# Patient Record
Sex: FEMALE | Race: WHITE | NOT HISPANIC OR LATINO | Employment: UNEMPLOYED | URBAN - NONMETROPOLITAN AREA
[De-identification: names, ages, dates, MRNs, and addresses within clinical notes are randomized per-mention and may not be internally consistent; named-entity substitution may affect disease eponyms.]

---

## 2017-01-26 ENCOUNTER — HOSPITAL ENCOUNTER (EMERGENCY)
Facility: HOSPITAL | Age: 67
Discharge: HOME | End: 2017-01-26
Attending: FAMILY MEDICINE | Admitting: FAMILY MEDICINE

## 2017-01-26 ENCOUNTER — APPOINTMENT (OUTPATIENT)
Dept: GENERAL RADIOLOGY | Facility: HOSPITAL | Age: 67
End: 2017-01-26

## 2017-01-26 VITALS
HEIGHT: 66 IN | OXYGEN SATURATION: 99 % | BODY MASS INDEX: 19.77 KG/M2 | SYSTOLIC BLOOD PRESSURE: 131 MMHG | TEMPERATURE: 98.2 F | HEART RATE: 93 BPM | WEIGHT: 123 LBS | RESPIRATION RATE: 24 BRPM | DIASTOLIC BLOOD PRESSURE: 75 MMHG

## 2017-01-26 DIAGNOSIS — R07.81 PLEURITIC CHEST PAIN: Primary | ICD-10-CM

## 2017-01-26 DIAGNOSIS — J40 BRONCHITIS: ICD-10-CM

## 2017-01-26 DIAGNOSIS — J44.1 COPD WITH ACUTE EXACERBATION (HCC): ICD-10-CM

## 2017-01-26 LAB
A-A DO2: 116.3 MMHG (ref 0–300)
ALBUMIN SERPL-MCNC: 4 G/DL (ref 3.4–4.8)
ALBUMIN/GLOB SERPL: 1.2 G/DL (ref 1.5–2.5)
ALP SERPL-CCNC: 138 U/L (ref 46–116)
ALT SERPL W P-5'-P-CCNC: 9 U/L (ref 10–36)
AMPHET+METHAMPHET UR QL: POSITIVE
ANION GAP SERPL CALCULATED.3IONS-SCNC: 7 MMOL/L (ref 3.6–11.2)
APTT PPP: 30.5 SECONDS (ref 24.4–31)
ARTERIAL PATENCY WRIST A: POSITIVE
AST SERPL-CCNC: 25 U/L (ref 10–30)
ATMOSPHERIC PRESS: 722 MMHG
BACTERIA UR QL AUTO: ABNORMAL /HPF
BARBITURATES UR QL SCN: NEGATIVE
BASE EXCESS BLDA CALC-SCNC: -2.4 MMOL/L
BASOPHILS # BLD AUTO: 0.05 10*3/MM3 (ref 0–0.3)
BASOPHILS NFR BLD AUTO: 0.6 % (ref 0–2)
BDY SITE: ABNORMAL
BENZODIAZ UR QL SCN: NEGATIVE
BILIRUB SERPL-MCNC: 0.3 MG/DL (ref 0.2–1.8)
BILIRUB UR QL STRIP: NEGATIVE
BNP SERPL-MCNC: 262 PG/ML (ref 0–100)
BODY TEMPERATURE: 98.6 C
BUN BLD-MCNC: <5 MG/DL (ref 7–21)
BUN/CREAT SERPL: ABNORMAL (ref 7–25)
CALCIUM SPEC-SCNC: 8.8 MG/DL (ref 7.7–10)
CANNABINOIDS SERPL QL: NEGATIVE
CHLORIDE SERPL-SCNC: 111 MMOL/L (ref 99–112)
CLARITY UR: ABNORMAL
CO2 SERPL-SCNC: 27 MMOL/L (ref 24.3–31.9)
COCAINE UR QL: NEGATIVE
COHGB MFR BLD: 2.8 % (ref 0–5)
COLOR UR: YELLOW
CREAT BLD-MCNC: 0.72 MG/DL (ref 0.43–1.29)
CRP SERPL-MCNC: 6.43 MG/DL (ref 0–0.99)
D-LACTATE SERPL-SCNC: 1.4 MMOL/L (ref 0.5–2)
DEPRECATED RDW RBC AUTO: 56.2 FL (ref 37–54)
EOSINOPHIL # BLD AUTO: 0.08 10*3/MM3 (ref 0–0.7)
EOSINOPHIL NFR BLD AUTO: 0.9 % (ref 0–7)
ERYTHROCYTE [DISTWIDTH] IN BLOOD BY AUTOMATED COUNT: 15.5 % (ref 11.5–14.5)
GFR SERPL CREATININE-BSD FRML MDRD: 81 ML/MIN/1.73
GLOBULIN UR ELPH-MCNC: 3.4 GM/DL
GLUCOSE BLD-MCNC: 120 MG/DL (ref 70–110)
GLUCOSE UR STRIP-MCNC: NEGATIVE MG/DL
HCO3 BLDA-SCNC: 23 MMOL/L (ref 22–26)
HCT VFR BLD AUTO: 36.9 % (ref 37–47)
HCT VFR BLD CALC: 37 % (ref 37–47)
HGB BLD-MCNC: 11.9 G/DL (ref 12–16)
HGB BLDA-MCNC: 12.6 G/DL (ref 12–16)
HGB UR QL STRIP.AUTO: ABNORMAL
HOROWITZ INDEX BLD+IHG-RTO: 36 %
HYALINE CASTS UR QL AUTO: ABNORMAL /LPF
IMM GRANULOCYTES # BLD: 0.01 10*3/MM3 (ref 0–0.03)
IMM GRANULOCYTES NFR BLD: 0.1 % (ref 0–0.5)
INR PPP: 0.95 (ref 0.8–1.1)
KETONES UR QL STRIP: NEGATIVE
LEUKOCYTE ESTERASE UR QL STRIP.AUTO: NEGATIVE
LYMPHOCYTES # BLD AUTO: 0.99 10*3/MM3 (ref 1–3)
LYMPHOCYTES NFR BLD AUTO: 11.7 % (ref 16–46)
MCH RBC QN AUTO: 31.8 PG (ref 27–33)
MCHC RBC AUTO-ENTMCNC: 32.2 G/DL (ref 33–37)
MCV RBC AUTO: 98.7 FL (ref 80–94)
METHADONE UR QL SCN: NEGATIVE
METHGB BLD QL: 0.2 % (ref 0–3)
MODALITY: ABNORMAL
MONOCYTES # BLD AUTO: 1.45 10*3/MM3 (ref 0.1–0.9)
MONOCYTES NFR BLD AUTO: 17.1 % (ref 0–12)
NEUTROPHILS # BLD AUTO: 5.9 10*3/MM3 (ref 1.4–6.5)
NEUTROPHILS NFR BLD AUTO: 69.6 % (ref 40–75)
NITRITE UR QL STRIP: NEGATIVE
OPIATES UR QL: NEGATIVE
OSMOLALITY SERPL CALC.SUM OF ELEC: NORMAL MOSM/KG (ref 273–305)
OXYCODONE UR QL SCN: NEGATIVE
OXYHGB MFR BLDV: 92.7 % (ref 85–100)
PCO2 BLDA: 41.6 MM HG (ref 35–45)
PCP UR QL SCN: NEGATIVE
PH BLDA: 7.36 PH UNITS (ref 7.35–7.45)
PH UR STRIP.AUTO: 5.5 [PH] (ref 5–8)
PLATELET # BLD AUTO: 256 10*3/MM3 (ref 130–400)
PMV BLD AUTO: 10.2 FL (ref 6–10)
PO2 BLDA: 78.4 MM HG (ref 80–100)
POTASSIUM BLD-SCNC: 3.4 MMOL/L (ref 3.5–5.3)
PROPOXYPH UR QL: NEGATIVE
PROT SERPL-MCNC: 7.4 G/DL (ref 6–8)
PROT UR QL STRIP: NEGATIVE
PROTHROMBIN TIME: 10.7 SECONDS (ref 9.8–11.9)
RBC # BLD AUTO: 3.74 10*6/MM3 (ref 4.2–5.4)
RBC # UR: ABNORMAL /HPF
REF LAB TEST METHOD: ABNORMAL
SAO2 % BLDCOA: 95.6 % (ref 90–100)
SODIUM BLD-SCNC: 145 MMOL/L (ref 135–153)
SP GR UR STRIP: 1.03 (ref 1–1.03)
SQUAMOUS #/AREA URNS HPF: ABNORMAL /HPF
TROPONIN I SERPL-MCNC: <0.006 NG/ML
UROBILINOGEN UR QL STRIP: ABNORMAL
WBC NRBC COR # BLD: 8.48 10*3/MM3 (ref 4.5–12.5)
WBC UR QL AUTO: ABNORMAL /HPF
YEAST URNS QL MICRO: ABNORMAL /HPF

## 2017-01-26 PROCEDURE — 80307 DRUG TEST PRSMV CHEM ANLYZR: CPT | Performed by: FAMILY MEDICINE

## 2017-01-26 PROCEDURE — 81001 URINALYSIS AUTO W/SCOPE: CPT | Performed by: FAMILY MEDICINE

## 2017-01-26 PROCEDURE — 86140 C-REACTIVE PROTEIN: CPT | Performed by: FAMILY MEDICINE

## 2017-01-26 PROCEDURE — 93005 ELECTROCARDIOGRAM TRACING: CPT | Performed by: FAMILY MEDICINE

## 2017-01-26 PROCEDURE — 82375 ASSAY CARBOXYHB QUANT: CPT | Performed by: FAMILY MEDICINE

## 2017-01-26 PROCEDURE — 93010 ELECTROCARDIOGRAM REPORT: CPT | Performed by: INTERNAL MEDICINE

## 2017-01-26 PROCEDURE — 87086 URINE CULTURE/COLONY COUNT: CPT | Performed by: FAMILY MEDICINE

## 2017-01-26 PROCEDURE — 82805 BLOOD GASES W/O2 SATURATION: CPT | Performed by: FAMILY MEDICINE

## 2017-01-26 PROCEDURE — 94640 AIRWAY INHALATION TREATMENT: CPT

## 2017-01-26 PROCEDURE — 83605 ASSAY OF LACTIC ACID: CPT | Performed by: FAMILY MEDICINE

## 2017-01-26 PROCEDURE — 71010 HC CHEST PA OR AP: CPT

## 2017-01-26 PROCEDURE — 84484 ASSAY OF TROPONIN QUANT: CPT | Performed by: FAMILY MEDICINE

## 2017-01-26 PROCEDURE — 83050 HGB METHEMOGLOBIN QUAN: CPT | Performed by: FAMILY MEDICINE

## 2017-01-26 PROCEDURE — 96375 TX/PRO/DX INJ NEW DRUG ADDON: CPT

## 2017-01-26 PROCEDURE — 87040 BLOOD CULTURE FOR BACTERIA: CPT | Performed by: FAMILY MEDICINE

## 2017-01-26 PROCEDURE — 85025 COMPLETE CBC W/AUTO DIFF WBC: CPT | Performed by: FAMILY MEDICINE

## 2017-01-26 PROCEDURE — 96365 THER/PROPH/DIAG IV INF INIT: CPT

## 2017-01-26 PROCEDURE — 71010 XR CHEST 1 VW: CPT | Performed by: RADIOLOGY

## 2017-01-26 PROCEDURE — 85610 PROTHROMBIN TIME: CPT | Performed by: FAMILY MEDICINE

## 2017-01-26 PROCEDURE — 25010000002 CEFTRIAXONE: Performed by: FAMILY MEDICINE

## 2017-01-26 PROCEDURE — 85730 THROMBOPLASTIN TIME PARTIAL: CPT | Performed by: FAMILY MEDICINE

## 2017-01-26 PROCEDURE — 36600 WITHDRAWAL OF ARTERIAL BLOOD: CPT | Performed by: FAMILY MEDICINE

## 2017-01-26 PROCEDURE — 80053 COMPREHEN METABOLIC PANEL: CPT | Performed by: FAMILY MEDICINE

## 2017-01-26 PROCEDURE — 25010000002 METHYLPREDNISOLONE PER 125 MG: Performed by: FAMILY MEDICINE

## 2017-01-26 PROCEDURE — 83880 ASSAY OF NATRIURETIC PEPTIDE: CPT | Performed by: FAMILY MEDICINE

## 2017-01-26 PROCEDURE — 99284 EMERGENCY DEPT VISIT MOD MDM: CPT

## 2017-01-26 PROCEDURE — 36415 COLL VENOUS BLD VENIPUNCTURE: CPT

## 2017-01-26 RX ORDER — SODIUM CHLORIDE 0.9 % (FLUSH) 0.9 %
10 SYRINGE (ML) INJECTION AS NEEDED
Status: DISCONTINUED | OUTPATIENT
Start: 2017-01-26 | End: 2017-01-26 | Stop reason: HOSPADM

## 2017-01-26 RX ORDER — ACETAMINOPHEN 325 MG/1
TABLET ORAL
Status: COMPLETED
Start: 2017-01-26 | End: 2017-01-26

## 2017-01-26 RX ORDER — METHYLPREDNISOLONE SODIUM SUCCINATE 125 MG/2ML
125 INJECTION, POWDER, LYOPHILIZED, FOR SOLUTION INTRAMUSCULAR; INTRAVENOUS ONCE
Status: COMPLETED | OUTPATIENT
Start: 2017-01-26 | End: 2017-01-26

## 2017-01-26 RX ORDER — PREDNISONE 20 MG/1
20 TABLET ORAL 3 TIMES DAILY
Qty: 15 TABLET | Refills: 0 | Status: SHIPPED | OUTPATIENT
Start: 2017-01-26 | End: 2017-01-31

## 2017-01-26 RX ORDER — DOXYCYCLINE 100 MG/1
100 CAPSULE ORAL ONCE
Status: COMPLETED | OUTPATIENT
Start: 2017-01-26 | End: 2017-01-26

## 2017-01-26 RX ORDER — DOXYCYCLINE HYCLATE 100 MG/1
100 TABLET, DELAYED RELEASE ORAL 2 TIMES DAILY
Qty: 20 TABLET | Refills: 0 | Status: SHIPPED | OUTPATIENT
Start: 2017-01-26 | End: 2017-02-05

## 2017-01-26 RX ORDER — IPRATROPIUM BROMIDE AND ALBUTEROL SULFATE 2.5; .5 MG/3ML; MG/3ML
3 SOLUTION RESPIRATORY (INHALATION) ONCE
Status: COMPLETED | OUTPATIENT
Start: 2017-01-26 | End: 2017-01-26

## 2017-01-26 RX ORDER — ACETAMINOPHEN 325 MG/1
1000 TABLET ORAL ONCE
Status: COMPLETED | OUTPATIENT
Start: 2017-01-26 | End: 2017-01-26

## 2017-01-26 RX ADMIN — IPRATROPIUM BROMIDE AND ALBUTEROL SULFATE 3 ML: .5; 3 SOLUTION RESPIRATORY (INHALATION) at 16:33

## 2017-01-26 RX ADMIN — CEFTRIAXONE 1 G: 1 INJECTION, POWDER, FOR SOLUTION INTRAMUSCULAR; INTRAVENOUS at 18:52

## 2017-01-26 RX ADMIN — HYDROCODONE POLISTIREX AND CHLORPHENIRAMINE POLISTIREX 5 ML: 10; 8 SUSPENSION, EXTENDED RELEASE ORAL at 18:52

## 2017-01-26 RX ADMIN — METHYLPREDNISOLONE SODIUM SUCCINATE 125 MG: 125 INJECTION, POWDER, FOR SOLUTION INTRAMUSCULAR; INTRAVENOUS at 17:20

## 2017-01-26 RX ADMIN — ACETAMINOPHEN 975 MG: 325 TABLET ORAL at 17:54

## 2017-01-26 RX ADMIN — DOXYCYCLINE 100 MG: 100 CAPSULE ORAL at 18:52

## 2017-01-26 RX ADMIN — ACETAMINOPHEN 975 MG: 325 TABLET, FILM COATED ORAL at 17:54

## 2017-01-26 NOTE — DISCHARGE INSTRUCTIONS
Advised pt to continue home oxygen at 4 L   Also to do breathing treatments ( [facility]) q4hrs for the next 3 days

## 2017-01-26 NOTE — ED PROVIDER NOTES
Subjective   Patient is a 66 y.o. female presenting with shortness of breath.   History provided by:  Relative and patient  Shortness of Breath   Severity:  Moderate  Onset quality:  Gradual  Timing:  Intermittent  Progression:  Worsening  Chronicity:  New  Context: not activity    Relieved by:  Nothing  Worsened by:  Coughing  Ineffective treatments:  None tried  Associated symptoms: cough, sore throat and sputum production    Associated symptoms: no abdominal pain, no chest pain, no fever, no headaches, no neck pain, no rash, no vomiting and no wheezing    Risk factors: tobacco use    Risk factors: no recent alcohol use, no family hx of DVT, no hx of cancer, no hx of PE/DVT, no obesity, no oral contraceptive use, no prolonged immobilization and no recent surgery        Review of Systems   Constitutional: Negative for activity change, appetite change, chills, fatigue and fever.   HENT: Positive for sore throat. Negative for congestion.    Eyes: Negative for pain.   Respiratory: Positive for cough, sputum production and shortness of breath. Negative for wheezing and stridor.    Cardiovascular: Negative for chest pain.   Gastrointestinal: Negative for abdominal pain, diarrhea, nausea and vomiting.   Genitourinary: Negative for dysuria.   Musculoskeletal: Negative for arthralgias, myalgias, neck pain and neck stiffness.   Skin: Negative for rash.   Neurological: Negative for dizziness, syncope, speech difficulty, weakness and headaches.   Psychiatric/Behavioral: Negative for agitation.       Past Medical History   Diagnosis Date   • Anxiety    • Cancer      skin cancer   • CHF (congestive heart failure)    • COPD (chronic obstructive pulmonary disease)    • Coronary artery disease    • Disease of thyroid gland    • History of transfusion    • Hypertension    • Panic attack    • Peptic ulcer hemorrhagic    • PONV (postoperative nausea and vomiting)    • Seizure    • Stroke      TIA       No Known Allergies    Past  Surgical History   Procedure Laterality Date   • Esophagoscopy / egd  2001   • Hysterectomy     • Shoulder surgery     • Cardiac surgery     • Coronary angioplasty with stent placement  2011 12 cardiac stents   • Fracture surgery     • Vascular surgery     • Cardiac pacemaker placement  2012   • Cholecystectomy with intraoperative cholangiogram N/A 9/27/2016     Procedure: CHOLECYSTECTOMY LAPAROSCOPIC INTRAOPERATIVE CHOLANGIOGRAM;  Surgeon: Gonzalez Marquis MD;  Location: [facility] OR;  Service:        History reviewed. No pertinent family history.    Social History     Social History   • Marital status:      Spouse name: N/A   • Number of children: N/A   • Years of education: N/A     Social History Main Topics   • Smoking status: Current Every Day Smoker     Packs/day: 1.00   • Smokeless tobacco: Never Used   • Alcohol use No   • Drug use: No   • Sexual activity: Defer     Other Topics Concern   • None     Social History Narrative           Objective   Physical Exam   Constitutional: She is oriented to person, place, and time. She appears well-developed and well-nourished.   HENT:   Head: Normocephalic.   Right Ear: External ear normal.   Left Ear: External ear normal.   Nose: Nose normal.   Mouth/Throat: Oropharynx is clear and moist.   Eyes: EOM are normal. Pupils are equal, round, and reactive to light.   Neck: Neck supple. No tracheal deviation present. No thyromegaly present.   Cardiovascular: Normal rate and regular rhythm.    Pulmonary/Chest: Effort normal. She has wheezes.   Abdominal: Soft.   Musculoskeletal: Normal range of motion.   Neurological: She is alert and oriented to person, place, and time.   Skin: Skin is warm.   Psychiatric: She has a normal mood and affect. Her behavior is normal. Judgment and thought content normal.   Nursing note and vitals reviewed.      Procedures         ED Course  ED Course                  MDM  Number of Diagnoses or Management Options  Bronchitis: new and  does not require workup  COPD with acute exacerbation: established and improving  Pleuritic chest pain: new and does not require workup     Amount and/or Complexity of Data Reviewed  Clinical lab tests: ordered and reviewed  Tests in the radiology section of CPT®: ordered and reviewed  Tests in the medicine section of CPT®: reviewed and ordered  Independent visualization of images, tracings, or specimens: yes    Risk of Complications, Morbidity, and/or Mortality  Presenting problems: moderate  Diagnostic procedures: moderate  Management options: moderate    Patient Progress  Patient progress: stable      Final diagnoses:   Pleuritic chest pain   COPD with acute exacerbation   Bronchitis            Kisha Churchill DO  01/27/17 0814

## 2017-01-27 NOTE — ED NOTES
Patient leaving ED with family x1. Patient has no further needs or questions at discharge. Patient is alert and oriented x4, verbalizes understanding of discharge instructions and follow up care. Patient's IV removed before patient leaving the ED. Patient is in no acute distress at discharge.     Maximino Wilder RN  01/26/17 1939

## 2017-01-29 LAB — BACTERIA SPEC AEROBE CULT: NORMAL

## 2017-01-31 LAB
BACTERIA SPEC AEROBE CULT: NORMAL
BACTERIA SPEC AEROBE CULT: NORMAL

## 2017-03-07 ENCOUNTER — TRANSCRIBE ORDERS (OUTPATIENT)
Dept: ADMINISTRATIVE | Facility: HOSPITAL | Age: 67
End: 2017-03-07

## 2017-03-07 DIAGNOSIS — R06.00 DYSPNEA, UNSPECIFIED TYPE: Primary | ICD-10-CM

## 2017-03-14 ENCOUNTER — APPOINTMENT (OUTPATIENT)
Dept: GENERAL RADIOLOGY | Facility: HOSPITAL | Age: 67
End: 2017-03-14

## 2017-03-14 ENCOUNTER — HOSPITAL ENCOUNTER (EMERGENCY)
Facility: HOSPITAL | Age: 67
Discharge: HOME | End: 2017-03-14
Attending: FAMILY MEDICINE | Admitting: FAMILY MEDICINE

## 2017-03-14 ENCOUNTER — APPOINTMENT (OUTPATIENT)
Dept: CT IMAGING | Facility: HOSPITAL | Age: 67
End: 2017-03-14

## 2017-03-14 VITALS
HEART RATE: 100 BPM | HEIGHT: 65 IN | RESPIRATION RATE: 18 BRPM | DIASTOLIC BLOOD PRESSURE: 70 MMHG | TEMPERATURE: 97.9 F | OXYGEN SATURATION: 97 % | BODY MASS INDEX: 22.33 KG/M2 | WEIGHT: 134 LBS | SYSTOLIC BLOOD PRESSURE: 113 MMHG

## 2017-03-14 DIAGNOSIS — S63.91XA HAND SPRAIN, RIGHT, INITIAL ENCOUNTER: Primary | ICD-10-CM

## 2017-03-14 DIAGNOSIS — S63.501A WRIST SPRAIN, RIGHT, INITIAL ENCOUNTER: ICD-10-CM

## 2017-03-14 PROCEDURE — 25010000002 MORPHINE PER 10 MG: Performed by: FAMILY MEDICINE

## 2017-03-14 PROCEDURE — 99283 EMERGENCY DEPT VISIT LOW MDM: CPT

## 2017-03-14 PROCEDURE — 25010000002 KETOROLAC TROMETHAMINE PER 15 MG: Performed by: FAMILY MEDICINE

## 2017-03-14 PROCEDURE — 96372 THER/PROPH/DIAG INJ SC/IM: CPT

## 2017-03-14 PROCEDURE — 73130 X-RAY EXAM OF HAND: CPT | Performed by: RADIOLOGY

## 2017-03-14 PROCEDURE — 73130 X-RAY EXAM OF HAND: CPT

## 2017-03-14 PROCEDURE — 73110 X-RAY EXAM OF WRIST: CPT | Performed by: RADIOLOGY

## 2017-03-14 PROCEDURE — 73110 X-RAY EXAM OF WRIST: CPT

## 2017-03-14 RX ORDER — MORPHINE SULFATE 2 MG/ML
2 INJECTION, SOLUTION INTRAMUSCULAR; INTRAVENOUS ONCE
Status: COMPLETED | OUTPATIENT
Start: 2017-03-14 | End: 2017-03-14

## 2017-03-14 RX ORDER — ACETAMINOPHEN AND CODEINE PHOSPHATE 300; 30 MG/1; MG/1
1 TABLET ORAL EVERY 6 HOURS PRN
Qty: 12 TABLET | Refills: 0 | Status: SHIPPED | OUTPATIENT
Start: 2017-03-14 | End: 2019-04-26

## 2017-03-14 RX ORDER — KETOROLAC TROMETHAMINE 30 MG/ML
30 INJECTION, SOLUTION INTRAMUSCULAR; INTRAVENOUS ONCE
Status: COMPLETED | OUTPATIENT
Start: 2017-03-14 | End: 2017-03-14

## 2017-03-14 RX ADMIN — KETOROLAC TROMETHAMINE 30 MG: 30 INJECTION, SOLUTION INTRAMUSCULAR; INTRAVENOUS at 17:16

## 2017-03-14 RX ADMIN — MORPHINE SULFATE 2 MG: 2 INJECTION, SOLUTION INTRAMUSCULAR; INTRAVENOUS at 17:16

## 2017-03-14 NOTE — ED PROVIDER NOTES
Subjective   History of Present Illness  65 y/o F 24 hours after a fall striking her R hand and wrist. Pt states that she has had pain and decreased ability to move R hand since hitting it. Pt denies any numbness/tingling. ROM is decreased 2/2 pain.   Review of Systems   Constitutional: Negative for chills and fever.   HENT: Negative for trouble swallowing and voice change.    Eyes: Negative for pain and redness.   Respiratory: Negative for apnea, cough, shortness of breath, wheezing and stridor.    Cardiovascular: Negative for chest pain and palpitations.   Gastrointestinal: Negative for abdominal distention and abdominal pain.   Musculoskeletal:        +R hand and wrist pain   Skin: Negative for color change and pallor.   Neurological: Negative for dizziness, seizures, facial asymmetry, speech difficulty, light-headedness, numbness and headaches.       Past Medical History   Diagnosis Date   • Anxiety    • Cancer      skin cancer   • CHF (congestive heart failure)    • COPD (chronic obstructive pulmonary disease)    • Coronary artery disease    • Disease of thyroid gland    • History of transfusion    • Hypertension    • Panic attack    • Peptic ulcer hemorrhagic    • PONV (postoperative nausea and vomiting)    • Seizure    • Stroke      TIA       No Known Allergies    Past Surgical History   Procedure Laterality Date   • Esophagoscopy / egd  2001   • Hysterectomy     • Shoulder surgery     • Cardiac surgery     • Coronary angioplasty with stent placement  2011 12 cardiac stents   • Fracture surgery     • Vascular surgery     • Cardiac pacemaker placement  2012   • Cholecystectomy with intraoperative cholangiogram N/A 9/27/2016     Procedure: CHOLECYSTECTOMY LAPAROSCOPIC INTRAOPERATIVE CHOLANGIOGRAM;  Surgeon: Gonzalez Marquis MD;  Location: [facility];  Service:        History reviewed. No pertinent family history.    Social History     Social History   • Marital status:      Spouse name: N/A   •  Number of children: N/A   • Years of education: N/A     Social History Main Topics   • Smoking status: Current Every Day Smoker     Packs/day: 1.00   • Smokeless tobacco: Never Used   • Alcohol use No   • Drug use: No   • Sexual activity: Defer     Other Topics Concern   • None     Social History Narrative           Objective   Physical Exam   Constitutional: She is oriented to person, place, and time. She appears well-developed and well-nourished.   HENT:   Head: Normocephalic and atraumatic.   Right Ear: External ear normal.   Left Ear: External ear normal.   Nose: Nose normal.   Mouth/Throat: Oropharynx is clear and moist.   Eyes: Conjunctivae and EOM are normal. Pupils are equal, round, and reactive to light.   Neck: Normal range of motion. Neck supple.   Cardiovascular: Normal rate, regular rhythm and normal heart sounds.    Pulmonary/Chest: Effort normal and breath sounds normal.   Abdominal: Soft.   Musculoskeletal:        Right wrist: She exhibits decreased range of motion and tenderness. She exhibits no bony tenderness, no swelling, no effusion, no crepitus and no deformity.        Right hand: She exhibits decreased range of motion, tenderness and bony tenderness. She exhibits normal capillary refill, no deformity and no laceration. Normal sensation noted.   Neurological: She is alert and oriented to person, place, and time.   Skin: Skin is warm and dry.   Nursing note and vitals reviewed.      Procedures         ED Course  ED Course      Pt given splint and pain medication.            MDM  Number of Diagnoses or Management Options  Hand sprain, right, initial encounter: new and requires workup  Wrist sprain, right, initial encounter: new and requires workup     Amount and/or Complexity of Data Reviewed  Tests in the radiology section of CPT®: ordered and reviewed    Risk of Complications, Morbidity, and/or Mortality  Presenting problems: high  Diagnostic procedures: high  Management options:  high    Patient Progress  Patient progress: stable      Final diagnoses:   Hand sprain, right, initial encounter   Wrist sprain, right, initial encounter            Mark Maravilla MD  03/14/17 1811       Mark Maravilla MD  03/14/17 185

## 2017-04-05 ENCOUNTER — APPOINTMENT (OUTPATIENT)
Dept: CT IMAGING | Facility: HOSPITAL | Age: 67
End: 2017-04-05

## 2017-04-24 ENCOUNTER — APPOINTMENT (OUTPATIENT)
Dept: CT IMAGING | Facility: HOSPITAL | Age: 67
End: 2017-04-24

## 2017-04-27 ENCOUNTER — LAB (OUTPATIENT)
Dept: LAB | Facility: HOSPITAL | Age: 67
End: 2017-04-27

## 2017-04-27 ENCOUNTER — HOSPITAL ENCOUNTER (OUTPATIENT)
Dept: INFUSION THERAPY | Facility: HOSPITAL | Age: 67
Discharge: HOME | End: 2017-04-27

## 2017-04-27 ENCOUNTER — TRANSCRIBE ORDERS (OUTPATIENT)
Dept: ADMINISTRATIVE | Facility: HOSPITAL | Age: 67
End: 2017-04-27

## 2017-04-27 ENCOUNTER — HOSPITAL ENCOUNTER (OUTPATIENT)
Dept: CT IMAGING | Facility: HOSPITAL | Age: 67
Discharge: HOME | End: 2017-04-27
Admitting: NURSE PRACTITIONER

## 2017-04-27 DIAGNOSIS — I25.10 UNSPECIFIED CARDIOVASCULAR DISEASE: ICD-10-CM

## 2017-04-27 DIAGNOSIS — I25.10 UNSPECIFIED CARDIOVASCULAR DISEASE: Primary | ICD-10-CM

## 2017-04-27 DIAGNOSIS — E03.9 UNSPECIFIED HYPOTHYROIDISM: ICD-10-CM

## 2017-04-27 DIAGNOSIS — R06.00 DYSPNEA, UNSPECIFIED TYPE: ICD-10-CM

## 2017-04-27 LAB
ALBUMIN SERPL-MCNC: 3.8 G/DL (ref 3.4–4.8)
ALBUMIN/GLOB SERPL: 1.3 G/DL (ref 1.5–2.5)
ALP SERPL-CCNC: 104 U/L (ref 35–104)
ALT SERPL W P-5'-P-CCNC: 7 U/L (ref 10–36)
ANION GAP SERPL CALCULATED.3IONS-SCNC: 8.1 MMOL/L (ref 3.6–11.2)
AST SERPL-CCNC: 13 U/L (ref 10–30)
BASOPHILS # BLD AUTO: 0.05 10*3/MM3 (ref 0–0.3)
BASOPHILS NFR BLD AUTO: 0.9 % (ref 0–2)
BILIRUB SERPL-MCNC: 0.2 MG/DL (ref 0.2–1.8)
BUN BLD-MCNC: 8 MG/DL (ref 7–21)
BUN/CREAT SERPL: 10.5 (ref 7–25)
CALCIUM SPEC-SCNC: 8.7 MG/DL (ref 7.7–10)
CHLORIDE SERPL-SCNC: 113 MMOL/L (ref 99–112)
CHOLEST SERPL-MCNC: 117 MG/DL (ref 0–200)
CO2 SERPL-SCNC: 20.9 MMOL/L (ref 24.3–31.9)
CREAT BLD-MCNC: 0.76 MG/DL (ref 0.43–1.29)
CREAT BLDA-MCNC: 0.7 MG/DL (ref 0.6–1.3)
DEPRECATED RDW RBC AUTO: 52 FL (ref 37–54)
EOSINOPHIL # BLD AUTO: 0.2 10*3/MM3 (ref 0–0.7)
EOSINOPHIL NFR BLD AUTO: 3.5 % (ref 0–7)
ERYTHROCYTE [DISTWIDTH] IN BLOOD BY AUTOMATED COUNT: 14.8 % (ref 11.5–14.5)
GFR SERPL CREATININE-BSD FRML MDRD: 76 ML/MIN/1.73
GLOBULIN UR ELPH-MCNC: 2.9 GM/DL
GLUCOSE BLD-MCNC: 108 MG/DL (ref 70–110)
HCT VFR BLD AUTO: 34 % (ref 37–47)
HDLC SERPL-MCNC: 50 MG/DL (ref 60–100)
HGB BLD-MCNC: 10.5 G/DL (ref 12–16)
IMM GRANULOCYTES # BLD: 0.02 10*3/MM3 (ref 0–0.03)
IMM GRANULOCYTES NFR BLD: 0.4 % (ref 0–0.5)
LDLC SERPL CALC-MCNC: 40 MG/DL (ref 0–100)
LDLC/HDLC SERPL: 0.79 {RATIO}
LYMPHOCYTES # BLD AUTO: 2.36 10*3/MM3 (ref 1–3)
LYMPHOCYTES NFR BLD AUTO: 41.8 % (ref 16–46)
MCH RBC QN AUTO: 30.8 PG (ref 27–33)
MCHC RBC AUTO-ENTMCNC: 30.9 G/DL (ref 33–37)
MCV RBC AUTO: 99.7 FL (ref 80–94)
MONOCYTES # BLD AUTO: 0.81 10*3/MM3 (ref 0.1–0.9)
MONOCYTES NFR BLD AUTO: 14.3 % (ref 0–12)
NEUTROPHILS # BLD AUTO: 2.21 10*3/MM3 (ref 1.4–6.5)
NEUTROPHILS NFR BLD AUTO: 39.1 % (ref 40–75)
OSMOLALITY SERPL CALC.SUM OF ELEC: 282 MOSM/KG (ref 273–305)
PLATELET # BLD AUTO: 194 10*3/MM3 (ref 130–400)
PMV BLD AUTO: 10.7 FL (ref 6–10)
POTASSIUM BLD-SCNC: 4 MMOL/L (ref 3.5–5.3)
PROT SERPL-MCNC: 6.7 G/DL (ref 6–8)
RBC # BLD AUTO: 3.41 10*6/MM3 (ref 4.2–5.4)
SODIUM BLD-SCNC: 142 MMOL/L (ref 135–153)
TRIGL SERPL-MCNC: 137 MG/DL (ref 0–150)
TSH SERPL DL<=0.05 MIU/L-ACNC: 0.86 MIU/ML (ref 0.55–4.78)
VLDLC SERPL-MCNC: 27.4 MG/DL
WBC NRBC COR # BLD: 5.65 10*3/MM3 (ref 4.5–12.5)

## 2017-04-27 PROCEDURE — 80061 LIPID PANEL: CPT | Performed by: NURSE PRACTITIONER

## 2017-04-27 PROCEDURE — 85025 COMPLETE CBC W/AUTO DIFF WBC: CPT | Performed by: NURSE PRACTITIONER

## 2017-04-27 PROCEDURE — 36415 COLL VENOUS BLD VENIPUNCTURE: CPT

## 2017-04-27 PROCEDURE — 0 IOPAMIDOL PER 1 ML: Performed by: NURSE PRACTITIONER

## 2017-04-27 PROCEDURE — 84443 ASSAY THYROID STIM HORMONE: CPT | Performed by: NURSE PRACTITIONER

## 2017-04-27 PROCEDURE — 71275 CT ANGIOGRAPHY CHEST: CPT

## 2017-04-27 PROCEDURE — 71275 CT ANGIOGRAPHY CHEST: CPT | Performed by: RADIOLOGY

## 2017-04-27 PROCEDURE — 80053 COMPREHEN METABOLIC PANEL: CPT | Performed by: NURSE PRACTITIONER

## 2017-04-27 PROCEDURE — 80185 ASSAY OF PHENYTOIN TOTAL: CPT | Performed by: NURSE PRACTITIONER

## 2017-04-27 PROCEDURE — 80186 ASSAY OF PHENYTOIN FREE: CPT | Performed by: NURSE PRACTITIONER

## 2017-04-27 PROCEDURE — 82565 ASSAY OF CREATININE: CPT

## 2017-04-27 RX ADMIN — IOPAMIDOL 90 ML: 755 INJECTION, SOLUTION INTRAVENOUS at 13:21

## 2017-04-29 LAB
PHENYTOIN FREE SERPL-MCNC: 0.8 UG/ML (ref 1–2)
PHENYTOIN SERPL-MCNC: 8.4 UG/ML (ref 10–20)

## 2017-05-21 ENCOUNTER — HOSPITAL ENCOUNTER (EMERGENCY)
Facility: HOSPITAL | Age: 67
Discharge: LEFT BEFORE DISCHARGE | End: 2017-05-21

## 2017-05-21 PROCEDURE — 99211 OFF/OP EST MAY X REQ PHY/QHP: CPT

## 2017-05-22 VITALS
OXYGEN SATURATION: 99 % | BODY MASS INDEX: 23.49 KG/M2 | HEIGHT: 65 IN | RESPIRATION RATE: 18 BRPM | HEART RATE: 83 BPM | TEMPERATURE: 98.3 F | DIASTOLIC BLOOD PRESSURE: 75 MMHG | SYSTOLIC BLOOD PRESSURE: 127 MMHG | WEIGHT: 141 LBS

## 2017-11-08 ENCOUNTER — TRANSCRIBE ORDERS (OUTPATIENT)
Dept: ADMINISTRATIVE | Facility: HOSPITAL | Age: 67
End: 2017-11-08

## 2017-11-08 DIAGNOSIS — R91.8 MULTIPLE PULMONARY NODULES: Primary | ICD-10-CM

## 2017-11-13 ENCOUNTER — HOSPITAL ENCOUNTER (OUTPATIENT)
Dept: CT IMAGING | Facility: HOSPITAL | Age: 67
Discharge: HOME | End: 2017-11-13
Admitting: INTERNAL MEDICINE

## 2017-11-13 DIAGNOSIS — R91.8 MULTIPLE PULMONARY NODULES: ICD-10-CM

## 2017-11-13 PROCEDURE — 71250 CT THORAX DX C-: CPT | Performed by: RADIOLOGY

## 2017-11-13 PROCEDURE — 71250 CT THORAX DX C-: CPT

## 2018-06-19 ENCOUNTER — OFFICE VISIT (OUTPATIENT)
Dept: FAMILY MEDICINE CLINIC | Facility: CLINIC | Age: 68
End: 2018-06-19

## 2018-06-19 VITALS
DIASTOLIC BLOOD PRESSURE: 60 MMHG | SYSTOLIC BLOOD PRESSURE: 100 MMHG | TEMPERATURE: 98.2 F | WEIGHT: 126 LBS | HEART RATE: 63 BPM | BODY MASS INDEX: 20.99 KG/M2 | HEIGHT: 65 IN

## 2018-06-19 DIAGNOSIS — M81.0 OSTEOPOROSIS, SENILE: ICD-10-CM

## 2018-06-19 DIAGNOSIS — E03.9 ACQUIRED HYPOTHYROIDISM: ICD-10-CM

## 2018-06-19 DIAGNOSIS — M54.41 CHRONIC BILATERAL LOW BACK PAIN WITH BILATERAL SCIATICA: Primary | ICD-10-CM

## 2018-06-19 DIAGNOSIS — I25.118 CORONARY ARTERY DISEASE OF NATIVE ARTERY OF NATIVE HEART WITH STABLE ANGINA PECTORIS (HCC): ICD-10-CM

## 2018-06-19 DIAGNOSIS — J30.89 CHRONIC NONSEASONAL ALLERGIC RHINITIS DUE TO POLLEN: ICD-10-CM

## 2018-06-19 DIAGNOSIS — I10 ESSENTIAL HYPERTENSION: ICD-10-CM

## 2018-06-19 DIAGNOSIS — Z12.2 ENCOUNTER FOR SCREENING FOR LUNG CANCER: ICD-10-CM

## 2018-06-19 DIAGNOSIS — G89.29 CHRONIC BILATERAL LOW BACK PAIN WITH BILATERAL SCIATICA: Primary | ICD-10-CM

## 2018-06-19 DIAGNOSIS — G43.109 MIGRAINE WITH AURA AND WITHOUT STATUS MIGRAINOSUS, NOT INTRACTABLE: ICD-10-CM

## 2018-06-19 DIAGNOSIS — M54.42 CHRONIC BILATERAL LOW BACK PAIN WITH BILATERAL SCIATICA: Primary | ICD-10-CM

## 2018-06-19 DIAGNOSIS — F17.200 TOBACCO USE DISORDER: ICD-10-CM

## 2018-06-19 DIAGNOSIS — I50.42 CHRONIC COMBINED SYSTOLIC AND DIASTOLIC CONGESTIVE HEART FAILURE (HCC): ICD-10-CM

## 2018-06-19 DIAGNOSIS — R09.02 HYPOXIA: ICD-10-CM

## 2018-06-19 DIAGNOSIS — G40.309 NONINTRACTABLE GENERALIZED IDIOPATHIC EPILEPSY WITHOUT STATUS EPILEPTICUS (HCC): ICD-10-CM

## 2018-06-19 DIAGNOSIS — F17.218 NICOTINE DEPENDENCE, CIGARETTES, WITH OTHER NICOTINE-INDUCED DISORDERS: ICD-10-CM

## 2018-06-19 DIAGNOSIS — M25.551 RIGHT HIP PAIN: ICD-10-CM

## 2018-06-19 DIAGNOSIS — E78.2 MIXED HYPERLIPIDEMIA: ICD-10-CM

## 2018-06-19 DIAGNOSIS — K21.00 GASTROESOPHAGEAL REFLUX DISEASE WITH ESOPHAGITIS: ICD-10-CM

## 2018-06-19 DIAGNOSIS — F41.1 GAD (GENERALIZED ANXIETY DISORDER): ICD-10-CM

## 2018-06-19 DIAGNOSIS — F33.41 RECURRENT MAJOR DEPRESSIVE DISORDER, IN PARTIAL REMISSION (HCC): ICD-10-CM

## 2018-06-19 DIAGNOSIS — J41.0 SIMPLE CHRONIC BRONCHITIS (HCC): ICD-10-CM

## 2018-06-19 PROBLEM — M54.40 CHRONIC BILATERAL LOW BACK PAIN WITH SCIATICA: Status: ACTIVE | Noted: 2018-06-19

## 2018-06-19 PROCEDURE — 99214 OFFICE O/P EST MOD 30 MIN: CPT | Performed by: PHYSICIAN ASSISTANT

## 2018-06-19 RX ORDER — CITALOPRAM 20 MG/1
20 TABLET ORAL DAILY
Qty: 90 TABLET | Refills: 3 | Status: SHIPPED | OUTPATIENT
Start: 2018-06-19 | End: 2019-07-05 | Stop reason: SDUPTHER

## 2018-06-19 RX ORDER — ALBUTEROL SULFATE 2.5 MG/3ML
2.5 SOLUTION RESPIRATORY (INHALATION) EVERY 4 HOURS PRN
Qty: 360 ML | Refills: 3 | Status: SHIPPED | OUTPATIENT
Start: 2018-06-19 | End: 2018-06-26

## 2018-06-19 RX ORDER — ALBUTEROL SULFATE 90 UG/1
2 AEROSOL, METERED RESPIRATORY (INHALATION) 3 TIMES DAILY PRN
Qty: 52 G | Refills: 3 | Status: SHIPPED | OUTPATIENT
Start: 2018-06-19 | End: 2018-06-26

## 2018-06-19 RX ORDER — METOPROLOL TARTRATE 50 MG/1
50 TABLET, FILM COATED ORAL DAILY
Qty: 90 TABLET | Refills: 3 | Status: SHIPPED | OUTPATIENT
Start: 2018-06-19 | End: 2019-05-17 | Stop reason: SDUPTHER

## 2018-06-19 RX ORDER — ESOMEPRAZOLE MAGNESIUM 40 MG/1
40 CAPSULE, DELAYED RELEASE ORAL
Qty: 90 CAPSULE | Refills: 3 | Status: SHIPPED | OUTPATIENT
Start: 2018-06-19 | End: 2019-07-05 | Stop reason: SDUPTHER

## 2018-06-19 RX ORDER — FENOFIBRATE 145 MG/1
145 TABLET, COATED ORAL DAILY
Qty: 90 TABLET | Refills: 3 | Status: SHIPPED | OUTPATIENT
Start: 2018-06-19 | End: 2019-05-17 | Stop reason: SDUPTHER

## 2018-06-19 RX ORDER — VENLAFAXINE 75 MG/1
75 TABLET ORAL 2 TIMES DAILY
Qty: 180 TABLET | Refills: 3 | Status: SHIPPED | OUTPATIENT
Start: 2018-06-19 | End: 2019-07-05 | Stop reason: SDUPTHER

## 2018-06-19 RX ORDER — CLOPIDOGREL BISULFATE 75 MG/1
75 TABLET ORAL DAILY
Qty: 90 TABLET | Refills: 3 | Status: SHIPPED | OUTPATIENT
Start: 2018-06-19 | End: 2019-10-24 | Stop reason: SDUPTHER

## 2018-06-19 RX ORDER — POTASSIUM CHLORIDE 750 MG/1
10 TABLET, FILM COATED, EXTENDED RELEASE ORAL DAILY
Qty: 90 TABLET | Refills: 3 | Status: SHIPPED | OUTPATIENT
Start: 2018-06-19 | End: 2019-07-05 | Stop reason: SDUPTHER

## 2018-06-19 RX ORDER — ATORVASTATIN CALCIUM 40 MG/1
40 TABLET, FILM COATED ORAL DAILY
Qty: 90 TABLET | Refills: 3 | Status: SHIPPED | OUTPATIENT
Start: 2018-06-19 | End: 2019-07-05 | Stop reason: SDUPTHER

## 2018-06-19 RX ORDER — FUROSEMIDE 40 MG/1
40 TABLET ORAL DAILY
Qty: 90 TABLET | Refills: 3 | Status: SHIPPED | OUTPATIENT
Start: 2018-06-19 | End: 2020-09-25 | Stop reason: SDUPTHER

## 2018-06-19 RX ORDER — LEVOTHYROXINE SODIUM 0.03 MG/1
25 TABLET ORAL DAILY
Qty: 90 TABLET | Refills: 3 | Status: SHIPPED | OUTPATIENT
Start: 2018-06-19 | End: 2019-07-05 | Stop reason: SDUPTHER

## 2018-06-19 RX ORDER — RANITIDINE 150 MG/1
300 TABLET ORAL DAILY
Qty: 180 TABLET | Refills: 3 | Status: SHIPPED | OUTPATIENT
Start: 2018-06-19 | End: 2019-07-05 | Stop reason: SDUPTHER

## 2018-06-19 RX ORDER — PHENYTOIN SODIUM 100 MG/1
100 CAPSULE, EXTENDED RELEASE ORAL SEE ADMIN INSTRUCTIONS
Qty: 450 CAPSULE | Refills: 3 | Status: SHIPPED | OUTPATIENT
Start: 2018-06-19 | End: 2019-07-05 | Stop reason: SDUPTHER

## 2018-06-19 RX ORDER — ISOSORBIDE MONONITRATE 60 MG/1
60 TABLET, EXTENDED RELEASE ORAL DAILY
Qty: 90 TABLET | Refills: 3 | Status: SHIPPED | OUTPATIENT
Start: 2018-06-19 | End: 2020-09-25

## 2018-06-19 RX ORDER — MONTELUKAST SODIUM 10 MG/1
10 TABLET ORAL NIGHTLY
Qty: 90 TABLET | Refills: 5 | Status: SHIPPED | OUTPATIENT
Start: 2018-06-19 | End: 2020-04-30

## 2018-06-19 NOTE — PROGRESS NOTES
Subjective   Michaela Hutchison is a 67 y.o. female.     Chief complaint-establish care and multiple chronic medical issues including back pain    History of Present Illness     She is here today to establish care.    Chronic low back pain-  She complains of intermittent severe sharp back pains with constant dull low back pain bilaterally.  Pain radiates to the right and left leg but more often to the right.  She has associated right leg numbness and weakness.  She states that right leg will intermittently give out causing frequent falls.  Onset 30 years ago.  Some relief with gabapentin.  She reports abnormal imaging of her lower back in the past requiring her to have epidural injections.  She states imaging was done over 5 years ago in [place].    Fall-  She reports that her right leg weakness caused her to fall coming in to the doctor's office today.  When she fell she did excoriate her right elbow and forearm as well as hit her right hip.  She states that when she fell she throughout her arms to get the brunt of the fall.    Tobacco use disorder-  She complains of nicotine dependence.  She smokes 1 pack per day for over 40 years.  She states that she used to smoke 2 packs per day but has decreased to one pack per day on her own.  She complains of chronic shortness of breath and does have hypoxia requiring 24/7 oxygen which she is not wearing today.  She reports that she uses 3 L of oxygen and often carries a tank when leaving the home to continue oxygen therapy.  She states that her sister  of lung cancer.    Dyslipidemia  Compliance with treatment has been good. The patient exercises intermittently. She is currently being prescribed the following medication for her dyslipidemia - lifestyle modifcation, atorvastatin. Patient denies side effects associated with her medications. Most recent lipids include  Lab Results   Component Value Date    TRIG 137 2017    HDL 50 (L) 2017    LDL 40 2017   Lab work  was from ER visit.    Hypertension-well controlled with metoprolol and Lasix    Coronary artery disease-  She complains of coronary artery disease status post multiple stent placements (patient states 13 stents placed in the past).  She does have chronic angina which is controlled with Imdur.  She has a pacemaker/defibrillator.  She is followed by Dr. Claire at [facility] in [place].    Congestive heart failure-stable with Lasix    Migraine-  She complains of intermittent moderate to severe throbbing headaches with associated aura of flashing lights.  Headaches occur approximately 2-3 times per week lasting for several hours.  Headaches relieved with Tylenol No. 3.  Onset greater than 10 years ago.    COPD-not at goal due to continued cigarette smoking.  Currently taking Spiriva and albuterol inhaler    Chronic allergic rhinitis-stable with Singulair    Gastro esophageal reflux disease-controlled with Nexium    Hypothyroidism-stable with Synthroid 25 µg daily    Epilepsy- well controlled with Dilantin.  She states her last seizure was in 2003 that she does require the brand name to be medically necessary.    Senile osteoporosis-stable with calcium and vitamin D    Depression and anxiety-stable with Celexa.  She states she does not feel the BuSpar helps at all.      The following portions of the patient's history were reviewed and updated as appropriate: allergies, current medications, past family history, past medical history, past social history, past surgical history and problem list.    Review of Systems   Constitutional: Positive for fatigue (chronic). Negative for activity change, appetite change and fever.   HENT: Negative for ear pain, sinus pressure and sore throat.    Eyes: Negative for pain and visual disturbance.   Respiratory: Positive for cough (chronic) and shortness of breath (chronic). Negative for chest tightness.    Cardiovascular: Negative for chest pain and palpitations.   Gastrointestinal:  "Negative for abdominal pain, constipation, diarrhea, nausea and vomiting.   Endocrine: Negative for polydipsia and polyuria.   Genitourinary: Negative for dysuria and frequency.   Musculoskeletal: Positive for arthralgias, back pain and gait problem (frequent falls). Negative for myalgias.   Skin: Negative for color change and rash.   Allergic/Immunologic: Negative for food allergies and immunocompromised state.   Neurological: Negative for dizziness, syncope and headaches.   Hematological: Negative for adenopathy. Does not bruise/bleed easily.   Psychiatric/Behavioral: Positive for dysphoric mood. Negative for hallucinations, self-injury and suicidal ideas. The patient is nervous/anxious.        /60   Pulse 63   Temp 98.2 °F (36.8 °C)   Ht 165.1 cm (65\")   Wt 57.2 kg (126 lb)   BMI 20.97 kg/m²     Physical Exam   Constitutional: She is oriented to person, place, and time. She appears well-developed and well-nourished.   HENT:   Head: Normocephalic and atraumatic.   Nose: Nose normal.   Mouth/Throat: Oropharynx is clear and moist.   Eyes: Conjunctivae and EOM are normal. Pupils are equal, round, and reactive to light.   Neck: Normal range of motion. Neck supple. No tracheal deviation present. No thyromegaly present.   Cardiovascular: Normal rate, regular rhythm, normal heart sounds and intact distal pulses.    No murmur heard.  Pulmonary/Chest: Effort normal and breath sounds normal. No respiratory distress. She has no wheezes.   Abdominal: Soft. Bowel sounds are normal. There is no tenderness. There is no guarding.   Musculoskeletal: Normal range of motion. She exhibits tenderness. She exhibits no edema.   Approximately 2 cm excoriation noted to the skin of right proximal forearm near elbow.  No swelling, erythema, or contusion noted.  Tenderness noted right proximal femur.  Full range of motion with her right hip noted today.   Lymphadenopathy:     She has no cervical adenopathy.   Neurological: She is " alert and oriented to person, place, and time.   Skin: Skin is warm and dry. No rash noted.   Psychiatric: She has a normal mood and affect. Her behavior is normal.   Nursing note and vitals reviewed.      Assessment/Plan     Diagnoses and all orders for this visit:    Chronic bilateral low back pain with bilateral sciatica  -     XR Spine Lumbar AP & Lateral; Future  -     CT lumbar spine w wo contrast; Future    Right hip pain  -     XR Hip With or Without Pelvis 2 - 3 View Right; Future  -     XR femur 2 vw right; Future    Tobacco use disorder  -     CT chest low dose wo; Future    Encounter for screening for lung cancer  -     CT chest low dose wo; Future    Nicotine dependence, cigarettes, with other nicotine-induced disorders   -     CT chest low dose wo; Future    Hypoxia    Mixed hyperlipidemia  -     atorvastatin (LIPITOR) 40 MG tablet; Take 1 tablet by mouth Daily.    Coronary artery disease of native artery of native heart with stable angina pectoris  -     clopidogrel (PLAVIX) 75 MG tablet; Take 1 tablet by mouth Daily.  -     isosorbide mononitrate (IMDUR) 60 MG 24 hr tablet; Take 1 tablet by mouth Daily.    Essential hypertension  -     furosemide (LASIX) 40 MG tablet; Take 1 tablet by mouth Daily.  -     metoprolol tartrate (LOPRESSOR) 50 MG tablet; Take 1 tablet by mouth Daily.    Chronic combined systolic and diastolic congestive heart failure    Migraine with aura and without status migrainosus, not intractable  -     Ambulatory Referral to Neurology    Simple chronic bronchitis  -     tiotropium bromide monohydrate (SPIRIVA RESPIMAT) 2.5 MCG/ACT aerosol solution inhaler; Inhale 2 puffs Daily.    Chronic nonseasonal allergic rhinitis due to pollen    Gastroesophageal reflux disease with esophagitis  -     esomeprazole (nexIUM) 40 MG capsule; Take 1 capsule by mouth Every Morning Before Breakfast.  -     raNITIdine (ZANTAC) 150 MG tablet; Take 2 tablets by mouth Daily.    Acquired hypothyroidism  -      levothyroxine (SYNTHROID, LEVOTHROID) 25 MCG tablet; Take 1 tablet by mouth Daily.    Nonintractable generalized idiopathic epilepsy without status epilepticus  -     DILANTIN 100 MG ER capsule; Take 1 capsule by mouth See Admin Instructions. 2 qam and 3 qhs    Osteoporosis, senile    Recurrent major depressive disorder, in partial remission  -     citalopram (CeleXA) 20 MG tablet; Take 1 tablet by mouth Daily.    BRAEDEN (generalized anxiety disorder)    Other orders  -     albuterol (PROVENTIL HFA;VENTOLIN HFA) 108 (90 Base) MCG/ACT inhaler; Inhale 2 puffs 3 (Three) Times a Day As Needed for Wheezing.  -     albuterol (PROVENTIL) (2.5 MG/3ML) 0.083% nebulizer solution; Take 2.5 mg by nebulization Every 4 (Four) Hours As Needed for Wheezing.  -     fenofibrate (TRICOR) 145 MG tablet; Take 1 tablet by mouth Daily.  -     montelukast (SINGULAIR) 10 MG tablet; Take 1 tablet by mouth Every Night.  -     potassium chloride (K-DUR) 10 MEQ CR tablet; Take 1 tablet by mouth Daily.  -     venlafaxine (EFFEXOR) 75 MG tablet; Take 1 tablet by mouth 2 (Two) Times a Day.      Discontinue BuSpar since not efficacious  All of her nonnarcotic prescriptions were written today.  I'll refer her to neurology to discuss treatment for headaches.  Further imaging and evaluation will be needed on her lower back and leg pain due to progressive worsening of pain and frequent falls.           This document has been electronically signed by:  Birdei Shipley PA-C

## 2018-06-26 DIAGNOSIS — J42 CHRONIC BRONCHITIS, UNSPECIFIED CHRONIC BRONCHITIS TYPE (HCC): Primary | ICD-10-CM

## 2018-06-26 RX ORDER — ALBUTEROL SULFATE 90 UG/1
2 AEROSOL, METERED RESPIRATORY (INHALATION) EVERY 4 HOURS PRN
Qty: 1 INHALER | Refills: 5 | Status: SHIPPED | OUTPATIENT
Start: 2018-06-26

## 2018-06-26 RX ORDER — ALBUTEROL SULFATE 2.5 MG/3ML
2.5 SOLUTION RESPIRATORY (INHALATION) EVERY 4 HOURS PRN
Qty: 180 VIAL | Refills: 5 | Status: SHIPPED | OUTPATIENT
Start: 2018-06-26

## 2018-08-28 ENCOUNTER — APPOINTMENT (OUTPATIENT)
Dept: CT IMAGING | Facility: HOSPITAL | Age: 68
End: 2018-08-28

## 2018-08-28 ENCOUNTER — HOSPITAL ENCOUNTER (EMERGENCY)
Facility: HOSPITAL | Age: 68
Discharge: HOME | End: 2018-08-28
Admitting: FAMILY MEDICINE

## 2018-08-28 VITALS
DIASTOLIC BLOOD PRESSURE: 78 MMHG | OXYGEN SATURATION: 98 % | HEIGHT: 65 IN | RESPIRATION RATE: 16 BRPM | TEMPERATURE: 97.9 F | WEIGHT: 140 LBS | SYSTOLIC BLOOD PRESSURE: 124 MMHG | BODY MASS INDEX: 23.32 KG/M2 | HEART RATE: 69 BPM

## 2018-08-28 DIAGNOSIS — R91.8 PULMONARY NODULES: ICD-10-CM
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
## 2018-08-28 DIAGNOSIS — M47.819 OSTEOARTHRITIS OF SPINE WITHOUT MYELOPATHY OR RADICULOPATHY, UNSPECIFIED SPINAL REGION: ICD-10-CM

## 2018-08-28 DIAGNOSIS — R10.31 RIGHT LOWER QUADRANT ABDOMINAL PAIN: Primary | ICD-10-CM

## 2018-08-28 LAB
ALBUMIN SERPL-MCNC: 4.3 G/DL (ref 3.4–4.8)
ALBUMIN/GLOB SERPL: 1.3 G/DL (ref 1.5–2.5)
ALP SERPL-CCNC: 88 U/L (ref 35–104)
ALT SERPL W P-5'-P-CCNC: 13 U/L (ref 10–36)
ANION GAP SERPL CALCULATED.3IONS-SCNC: 3.7 MMOL/L (ref 3.6–11.2)
AST SERPL-CCNC: 30 U/L (ref 10–30)
BASOPHILS # BLD AUTO: 0.07 10*3/MM3 (ref 0–0.3)
BASOPHILS NFR BLD AUTO: 1 % (ref 0–2)
BILIRUB SERPL-MCNC: 0.2 MG/DL (ref 0.2–1.8)
BUN BLD-MCNC: 20 MG/DL (ref 7–21)
BUN/CREAT SERPL: 15 (ref 7–25)
CALCIUM SPEC-SCNC: 8.9 MG/DL (ref 7.7–10)
CHLORIDE SERPL-SCNC: 111 MMOL/L (ref 99–112)
CO2 SERPL-SCNC: 24.3 MMOL/L (ref 24.3–31.9)
CREAT BLD-MCNC: 1.33 MG/DL (ref 0.43–1.29)
DEPRECATED RDW RBC AUTO: 52.4 FL (ref 37–54)
EOSINOPHIL # BLD AUTO: 0.16 10*3/MM3 (ref 0–0.7)
EOSINOPHIL NFR BLD AUTO: 2.2 % (ref 0–7)
ERYTHROCYTE [DISTWIDTH] IN BLOOD BY AUTOMATED COUNT: 16 % (ref 11.5–14.5)
GFR SERPL CREATININE-BSD FRML MDRD: 40 ML/MIN/1.73
GLOBULIN UR ELPH-MCNC: 3.4 GM/DL
GLUCOSE BLD-MCNC: 96 MG/DL (ref 70–110)
HCT VFR BLD AUTO: 32.7 % (ref 37–47)
HGB BLD-MCNC: 10.5 G/DL (ref 12–16)
IMM GRANULOCYTES # BLD: 0.02 10*3/MM3 (ref 0–0.03)
IMM GRANULOCYTES NFR BLD: 0.3 % (ref 0–0.5)
INR PPP: 1.14 (ref 0.9–1.1)
LYMPHOCYTES # BLD AUTO: 2.76 10*3/MM3 (ref 1–3)
LYMPHOCYTES NFR BLD AUTO: 38.2 % (ref 16–46)
MCH RBC QN AUTO: 29.8 PG (ref 27–33)
MCHC RBC AUTO-ENTMCNC: 32.1 G/DL (ref 33–37)
MCV RBC AUTO: 92.9 FL (ref 80–94)
MONOCYTES # BLD AUTO: 1.4 10*3/MM3 (ref 0.1–0.9)
MONOCYTES NFR BLD AUTO: 19.4 % (ref 0–12)
NEUTROPHILS # BLD AUTO: 2.82 10*3/MM3 (ref 1.4–6.5)
NEUTROPHILS NFR BLD AUTO: 38.9 % (ref 40–75)
OSMOLALITY SERPL CALC.SUM OF ELEC: 280 MOSM/KG (ref 273–305)
PLATELET # BLD AUTO: 231 10*3/MM3 (ref 130–400)
PMV BLD AUTO: 10.1 FL (ref 6–10)
POTASSIUM BLD-SCNC: 4.1 MMOL/L (ref 3.5–5.3)
PROT SERPL-MCNC: 7.7 G/DL (ref 6–8)
PROTHROMBIN TIME: 14.9 SECONDS (ref 11–15.4)
RBC # BLD AUTO: 3.52 10*6/MM3 (ref 4.2–5.4)
SODIUM BLD-SCNC: 139 MMOL/L (ref 135–153)
WBC NRBC COR # BLD: 7.23 10*3/MM3 (ref 4.5–12.5)

## 2018-08-28 PROCEDURE — 0 IOPAMIDOL PER 1 ML: Performed by: PHYSICIAN ASSISTANT

## 2018-08-28 PROCEDURE — 72131 CT LUMBAR SPINE W/O DYE: CPT

## 2018-08-28 PROCEDURE — 70450 CT HEAD/BRAIN W/O DYE: CPT

## 2018-08-28 PROCEDURE — 72128 CT CHEST SPINE W/O DYE: CPT | Performed by: RADIOLOGY

## 2018-08-28 PROCEDURE — 71275 CT ANGIOGRAPHY CHEST: CPT | Performed by: RADIOLOGY

## 2018-08-28 PROCEDURE — 85025 COMPLETE CBC W/AUTO DIFF WBC: CPT | Performed by: PHYSICIAN ASSISTANT

## 2018-08-28 PROCEDURE — 72128 CT CHEST SPINE W/O DYE: CPT

## 2018-08-28 PROCEDURE — 72125 CT NECK SPINE W/O DYE: CPT

## 2018-08-28 PROCEDURE — 71275 CT ANGIOGRAPHY CHEST: CPT

## 2018-08-28 PROCEDURE — 36415 COLL VENOUS BLD VENIPUNCTURE: CPT

## 2018-08-28 PROCEDURE — 74177 CT ABD & PELVIS W/CONTRAST: CPT | Performed by: RADIOLOGY

## 2018-08-28 PROCEDURE — 72131 CT LUMBAR SPINE W/O DYE: CPT | Performed by: RADIOLOGY

## 2018-08-28 PROCEDURE — 85610 PROTHROMBIN TIME: CPT | Performed by: PHYSICIAN ASSISTANT

## 2018-08-28 PROCEDURE — 80053 COMPREHEN METABOLIC PANEL: CPT | Performed by: PHYSICIAN ASSISTANT

## 2018-08-28 PROCEDURE — 72125 CT NECK SPINE W/O DYE: CPT | Performed by: RADIOLOGY

## 2018-08-28 PROCEDURE — 99284 EMERGENCY DEPT VISIT MOD MDM: CPT

## 2018-08-28 PROCEDURE — 74177 CT ABD & PELVIS W/CONTRAST: CPT

## 2018-08-28 PROCEDURE — 70450 CT HEAD/BRAIN W/O DYE: CPT | Performed by: RADIOLOGY

## 2018-08-28 RX ORDER — SODIUM CHLORIDE 0.9 % (FLUSH) 0.9 %
10 SYRINGE (ML) INJECTION AS NEEDED
Status: DISCONTINUED | OUTPATIENT
Start: 2018-08-28 | End: 2018-08-29 | Stop reason: HOSPADM

## 2018-08-28 RX ADMIN — SODIUM CHLORIDE 1000 ML: 9 INJECTION, SOLUTION INTRAVENOUS at 21:59

## 2018-08-28 RX ADMIN — IOPAMIDOL 100 ML: 755 INJECTION, SOLUTION INTRAVENOUS at 21:44

## 2019-02-28 ENCOUNTER — TRANSITIONAL CARE MANAGEMENT TELEPHONE ENCOUNTER (OUTPATIENT)
Dept: FAMILY MEDICINE CLINIC | Facility: CLINIC | Age: 69
End: 2019-02-28

## 2019-02-28 NOTE — OUTREACH NOTE
JOSE ANTONIO call completed.  Please refer to TCM call flowsheet for call documentation.    Pt states she is doing pretty good and feeling much better. She has filled all her d/c rxs except for the neb solution. Pt states the pharmacy has to re-run it through her insurance today. She denies any questions or needs. She confirms hospital f/u 3/14, but is outside TCM. TCM applicable until 3/13. If an appt within TCM is needed, please contact pt to coordinate. Thank you.

## 2019-03-22 ENCOUNTER — OFFICE VISIT (OUTPATIENT)
Dept: FAMILY MEDICINE CLINIC | Facility: CLINIC | Age: 69
End: 2019-03-22

## 2019-03-22 VITALS
SYSTOLIC BLOOD PRESSURE: 120 MMHG | DIASTOLIC BLOOD PRESSURE: 70 MMHG | WEIGHT: 129 LBS | TEMPERATURE: 97.3 F | BODY MASS INDEX: 21.49 KG/M2 | HEIGHT: 65 IN | HEART RATE: 63 BPM | OXYGEN SATURATION: 95 %

## 2019-03-22 DIAGNOSIS — I10 ESSENTIAL HYPERTENSION: ICD-10-CM

## 2019-03-22 DIAGNOSIS — I50.42 CHRONIC COMBINED SYSTOLIC AND DIASTOLIC CONGESTIVE HEART FAILURE (HCC): ICD-10-CM

## 2019-03-22 DIAGNOSIS — J44.1 COPD WITH ACUTE EXACERBATION (HCC): Primary | ICD-10-CM

## 2019-03-22 DIAGNOSIS — E78.2 MIXED HYPERLIPIDEMIA: ICD-10-CM

## 2019-03-22 DIAGNOSIS — J44.1 COPD EXACERBATION (HCC): Primary | ICD-10-CM

## 2019-03-22 PROCEDURE — 99214 OFFICE O/P EST MOD 30 MIN: CPT | Performed by: PHYSICIAN ASSISTANT

## 2019-03-22 RX ORDER — PREDNISONE 20 MG/1
20 TABLET ORAL DAILY
Qty: 14 TABLET | Refills: 0 | Status: SHIPPED | OUTPATIENT
Start: 2019-03-22 | End: 2019-04-03

## 2019-03-22 RX ORDER — LEVOFLOXACIN 500 MG/1
500 TABLET, FILM COATED ORAL DAILY
Qty: 10 TABLET | Refills: 0 | Status: SHIPPED | OUTPATIENT
Start: 2019-03-22 | End: 2019-04-01

## 2019-03-22 NOTE — PROGRESS NOTES
"Iron Hutchison is a 68 y.o. female.       Chief Complaint -  Cough, hospital followup    History of Present Illness -      She is here for hospital follow-up for pneumonia.  She was admitted to [facility] in February and discharged on February 27, 2019.  She reports that symptoms had improved with IV antibiotics and steroids.    Cough-  She complains of productive cough and wheezing with associated bilateral \"lung pain\" that began approximately 5 days ago.  Minimal relief with 3 L of oxygen 24/7 and albuterol nebulizer treatments.    COPD-not at goal due to acute exacerbation and continues smoking 1/2-1 pack/day x 30-year history.    Hyperlipidemia-improved with Lipitor  Lab Results   Component Value Date    CHOL 117 04/27/2017    TRIG 137 04/27/2017    HDL 50 (L) 04/27/2017    LDL 40 04/27/2017     Hypertension-well controlled with Lasix and metoprolol    Congestive heart failure- not at goal due to shortness of breath with acute COPD.  She does continue to take Lasix.    The following portions of the patient's history were reviewed and updated as appropriate: allergies, current medications, past family history, past medical history, past social history, past surgical history and problem list.    Review of Systems   Constitutional: Positive for fatigue. Negative for activity change, appetite change and fever.   HENT: Positive for congestion. Negative for ear pain, sinus pressure and sore throat.    Eyes: Negative for pain and visual disturbance.   Respiratory: Positive for cough, chest tightness, shortness of breath and wheezing.    Cardiovascular: Negative for chest pain and palpitations.   Gastrointestinal: Negative for abdominal pain, constipation, diarrhea, nausea and vomiting.   Endocrine: Negative for polydipsia and polyuria.   Genitourinary: Negative for dysuria and frequency.   Musculoskeletal: Negative for back pain and myalgias.   Skin: Negative for color change and rash. " "  Allergic/Immunologic: Negative for food allergies and immunocompromised state.   Neurological: Negative for dizziness, syncope and headaches.   Hematological: Negative for adenopathy. Does not bruise/bleed easily.   Psychiatric/Behavioral: Negative for hallucinations and suicidal ideas. The patient is not nervous/anxious.        /70   Pulse 63   Temp 97.3 °F (36.3 °C) (Tympanic)   Ht 165.1 cm (65\")   Wt 58.5 kg (129 lb)   SpO2 95%   BMI 21.47 kg/m²   Admission on 08/28/2018, Discharged on 08/28/2018   Component Date Value Ref Range Status   • Glucose 08/28/2018 96  70 - 110 mg/dL Final   • BUN 08/28/2018 20  7 - 21 mg/dL Final   • Creatinine 08/28/2018 1.33* 0.43 - 1.29 mg/dL Final   • Sodium 08/28/2018 139  135 - 153 mmol/L Final   • Potassium 08/28/2018 4.1  3.5 - 5.3 mmol/L Final   • Chloride 08/28/2018 111  99 - 112 mmol/L Final   • CO2 08/28/2018 24.3  24.3 - 31.9 mmol/L Final   • Calcium 08/28/2018 8.9  7.7 - 10.0 mg/dL Final   • Total Protein 08/28/2018 7.7  6.0 - 8.0 g/dL Final   • Albumin 08/28/2018 4.30  3.40 - 4.80 g/dL Final   • ALT (SGPT) 08/28/2018 13  10 - 36 U/L Final   • AST (SGOT) 08/28/2018 30  10 - 30 U/L Final   • Alkaline Phosphatase 08/28/2018 88  35 - 104 U/L Final   • Total Bilirubin 08/28/2018 0.2  0.2 - 1.8 mg/dL Final   • eGFR Non African Amer 08/28/2018 40* >60 mL/min/1.73 Final   • Globulin 08/28/2018 3.4  gm/dL Final   • A/G Ratio 08/28/2018 1.3* 1.5 - 2.5 g/dL Final   • BUN/Creatinine Ratio 08/28/2018 15.0  7.0 - 25.0 Final   • Anion Gap 08/28/2018 3.7  3.6 - 11.2 mmol/L Final   • Protime 08/28/2018 14.9  11.0 - 15.4 Seconds Final   • INR 08/28/2018 1.14* 0.90 - 1.10 Final   • WBC 08/28/2018 7.23  4.50 - 12.50 10*3/mm3 Final   • RBC 08/28/2018 3.52* 4.20 - 5.40 10*6/mm3 Final   • Hemoglobin 08/28/2018 10.5* 12.0 - 16.0 g/dL Final   • Hematocrit 08/28/2018 32.7* 37.0 - 47.0 % Final   • MCV 08/28/2018 92.9  80.0 - 94.0 fL Final   • MCH 08/28/2018 29.8  27.0 - 33.0 pg Final "   • MCHC 08/28/2018 32.1* 33.0 - 37.0 g/dL Final   • RDW 08/28/2018 16.0* 11.5 - 14.5 % Final   • RDW-SD 08/28/2018 52.4  37.0 - 54.0 fl Final   • MPV 08/28/2018 10.1* 6.0 - 10.0 fL Final   • Platelets 08/28/2018 231  130 - 400 10*3/mm3 Final   • Neutrophil % 08/28/2018 38.9* 40.0 - 75.0 % Final   • Lymphocyte % 08/28/2018 38.2  16.0 - 46.0 % Final   • Monocyte % 08/28/2018 19.4* 0.0 - 12.0 % Final   • Eosinophil % 08/28/2018 2.2  0.0 - 7.0 % Final   • Basophil % 08/28/2018 1.0  0.0 - 2.0 % Final   • Immature Grans % 08/28/2018 0.3  0.0 - 0.5 % Final   • Neutrophils, Absolute 08/28/2018 2.82  1.40 - 6.50 10*3/mm3 Final   • Lymphocytes, Absolute 08/28/2018 2.76  1.00 - 3.00 10*3/mm3 Final   • Monocytes, Absolute 08/28/2018 1.40* 0.10 - 0.90 10*3/mm3 Final   • Eosinophils, Absolute 08/28/2018 0.16  0.00 - 0.70 10*3/mm3 Final   • Basophils, Absolute 08/28/2018 0.07  0.00 - 0.30 10*3/mm3 Final   • Immature Grans, Absolute 08/28/2018 0.02  0.00 - 0.03 10*3/mm3 Final   • Osmolality Calc 08/28/2018 280.0  273.0 - 305.0 mOsm/kg Final       Physical Exam   Constitutional: She is oriented to person, place, and time. She appears well-developed and well-nourished.   HENT:   Head: Normocephalic and atraumatic.   Nose: Nose normal.   Mouth/Throat: Oropharynx is clear and moist.   Eyes: Conjunctivae and EOM are normal. Pupils are equal, round, and reactive to light.   Neck: Normal range of motion. Neck supple. No tracheal deviation present. No thyromegaly present.   Cardiovascular: Normal rate, regular rhythm, normal heart sounds and intact distal pulses.   No murmur heard.  Pulmonary/Chest: Effort normal. No respiratory distress. She has wheezes.   Wheezing noted throughout bilateral lung fields   Abdominal: Soft. Bowel sounds are normal. There is no tenderness. There is no guarding.   Musculoskeletal: Normal range of motion. She exhibits no edema or tenderness.   Lymphadenopathy:     She has no cervical adenopathy.    Neurological: She is alert and oriented to person, place, and time.   Skin: Skin is warm and dry. No rash noted.   Psychiatric: She has a normal mood and affect. Her behavior is normal.   Nursing note and vitals reviewed.      Assessment/Plan     Diagnoses and all orders for this visit:    COPD with acute exacerbation (CMS/[facility])  -     levoFLOXacin (LEVAQUIN) 500 MG tablet; Take 1 tablet by mouth Daily for 10 days.  -     predniSONE (DELTASONE) 20 MG tablet; Take 1 tablet by mouth Daily.    Mixed hyperlipidemia  Comments:  Continue Lipitor    Essential hypertension  Comments:  Continue Lasix and metoprolol    Chronic combined systolic and diastolic congestive heart failure (CMS/[facility])  Comments:  Continue Lasix and risk factor modification      I will follow-up with her next week or sooner if needed.  She was advised that if symptoms worsen she should come back for further evaluation and treatment immediately.  She was counseled on the importance of smoking cessation.  She states she is not interested in smoking cessation at this time.           This document has been electronically signed by:  Birdie Shipley PA-C

## 2019-03-23 RX ORDER — GUAIFENESIN AND CODEINE PHOSPHATE 100; 10 MG/5ML; MG/5ML
5 SOLUTION ORAL 3 TIMES DAILY PRN
Qty: 118 ML | Refills: 0 | Status: SHIPPED | OUTPATIENT
Start: 2019-03-23 | End: 2019-04-26

## 2019-04-03 ENCOUNTER — OFFICE VISIT (OUTPATIENT)
Dept: FAMILY MEDICINE CLINIC | Facility: CLINIC | Age: 69
End: 2019-04-03

## 2019-04-03 VITALS
DIASTOLIC BLOOD PRESSURE: 80 MMHG | SYSTOLIC BLOOD PRESSURE: 120 MMHG | HEART RATE: 83 BPM | TEMPERATURE: 98.1 F | HEIGHT: 65 IN | OXYGEN SATURATION: 98 % | WEIGHT: 131 LBS | BODY MASS INDEX: 21.83 KG/M2

## 2019-04-03 DIAGNOSIS — J44.1 COPD EXACERBATION (HCC): Primary | ICD-10-CM

## 2019-04-03 DIAGNOSIS — M79.604 PAIN OF RIGHT LOWER EXTREMITY: ICD-10-CM

## 2019-04-03 DIAGNOSIS — E78.2 MIXED HYPERLIPIDEMIA: ICD-10-CM

## 2019-04-03 DIAGNOSIS — I10 ESSENTIAL HYPERTENSION: ICD-10-CM

## 2019-04-03 PROCEDURE — 99214 OFFICE O/P EST MOD 30 MIN: CPT | Performed by: PHYSICIAN ASSISTANT

## 2019-04-03 RX ORDER — DOXYCYCLINE HYCLATE 100 MG/1
100 CAPSULE ORAL 2 TIMES DAILY
Qty: 20 CAPSULE | Refills: 0 | Status: SHIPPED | OUTPATIENT
Start: 2019-04-03 | End: 2019-04-13

## 2019-04-03 RX ORDER — PREDNISONE 20 MG/1
20 TABLET ORAL SEE ADMIN INSTRUCTIONS
Qty: 32 TABLET | Refills: 0 | Status: SHIPPED | OUTPATIENT
Start: 2019-04-03 | End: 2019-11-26

## 2019-04-03 RX ORDER — FENOFIBRATE 200 MG/1
CAPSULE ORAL
Refills: 0 | COMMUNITY
Start: 2019-03-11 | End: 2019-05-15 | Stop reason: SDUPTHER

## 2019-04-03 RX ORDER — IPRATROPIUM BROMIDE AND ALBUTEROL SULFATE 2.5; .5 MG/3ML; MG/3ML
SOLUTION RESPIRATORY (INHALATION)
Refills: 5 | COMMUNITY
Start: 2019-02-27 | End: 2020-09-25 | Stop reason: SDUPTHER

## 2019-04-03 RX ORDER — METOPROLOL SUCCINATE 25 MG/1
TABLET, EXTENDED RELEASE ORAL DAILY
Refills: 2 | COMMUNITY
Start: 2019-02-27 | End: 2019-05-15 | Stop reason: SDUPTHER

## 2019-04-07 NOTE — PROGRESS NOTES
Subjective   Michaela Hutchison is a 68 y.o. female.       Chief Complaint -  cough    History of Present Illness -      Cough-  She complains of productive cough with associated shortness of breath and wheezing due to COPD exacerbation.  Some improvement with levaquin but not at goal. She is using her oxygen 24/7.    COPD- not at goal due to acute exacerbation    Right leg pain-  She complains of intermittent moderate sharp/burning that starts in the right leg near hip and radiates down right leg.    Hyperlipidemia-   Improved with tricor and lipitor  Lab Results   Component Value Date    CHOL 117 04/27/2017    TRIG 137 04/27/2017    HDL 50 (L) 04/27/2017    LDL 40 04/27/2017     Hypertension-  Controlled with lasix and metoprolol    The following portions of the patient's history were reviewed and updated as appropriate: allergies, current medications, past family history, past medical history, past social history, past surgical history and problem list.    Review of Systems   Constitutional: Positive for fatigue. Negative for activity change, appetite change and fever.   HENT: Negative for ear pain, sinus pressure and sore throat.    Eyes: Negative for pain and visual disturbance.   Respiratory: Positive for cough, shortness of breath and wheezing. Negative for chest tightness.    Cardiovascular: Negative for chest pain and palpitations.   Gastrointestinal: Negative for abdominal pain, constipation, diarrhea, nausea and vomiting.   Endocrine: Negative for polydipsia and polyuria.   Genitourinary: Negative for dysuria and frequency.   Musculoskeletal: Negative for back pain and myalgias.   Skin: Negative for color change and rash.   Allergic/Immunologic: Negative for food allergies and immunocompromised state.   Neurological: Negative for dizziness, syncope and headaches.        Right leg pain   Hematological: Negative for adenopathy. Does not bruise/bleed easily.   Psychiatric/Behavioral: Negative for hallucinations  "and suicidal ideas. The patient is not nervous/anxious.        /80   Pulse 83   Temp 98.1 °F (36.7 °C) (Temporal)   Ht 165.1 cm (65\")   Wt 59.4 kg (131 lb)   SpO2 98% Comment: on 2L of O2  BMI 21.80 kg/m²   Admission on 08/28/2018, Discharged on 08/28/2018   Component Date Value Ref Range Status   • Glucose 08/28/2018 96  70 - 110 mg/dL Final   • BUN 08/28/2018 20  7 - 21 mg/dL Final   • Creatinine 08/28/2018 1.33* 0.43 - 1.29 mg/dL Final   • Sodium 08/28/2018 139  135 - 153 mmol/L Final   • Potassium 08/28/2018 4.1  3.5 - 5.3 mmol/L Final   • Chloride 08/28/2018 111  99 - 112 mmol/L Final   • CO2 08/28/2018 24.3  24.3 - 31.9 mmol/L Final   • Calcium 08/28/2018 8.9  7.7 - 10.0 mg/dL Final   • Total Protein 08/28/2018 7.7  6.0 - 8.0 g/dL Final   • Albumin 08/28/2018 4.30  3.40 - 4.80 g/dL Final   • ALT (SGPT) 08/28/2018 13  10 - 36 U/L Final   • AST (SGOT) 08/28/2018 30  10 - 30 U/L Final   • Alkaline Phosphatase 08/28/2018 88  35 - 104 U/L Final   • Total Bilirubin 08/28/2018 0.2  0.2 - 1.8 mg/dL Final   • eGFR Non African Amer 08/28/2018 40* >60 mL/min/1.73 Final   • Globulin 08/28/2018 3.4  gm/dL Final   • A/G Ratio 08/28/2018 1.3* 1.5 - 2.5 g/dL Final   • BUN/Creatinine Ratio 08/28/2018 15.0  7.0 - 25.0 Final   • Anion Gap 08/28/2018 3.7  3.6 - 11.2 mmol/L Final   • Protime 08/28/2018 14.9  11.0 - 15.4 Seconds Final   • INR 08/28/2018 1.14* 0.90 - 1.10 Final   • WBC 08/28/2018 7.23  4.50 - 12.50 10*3/mm3 Final   • RBC 08/28/2018 3.52* 4.20 - 5.40 10*6/mm3 Final   • Hemoglobin 08/28/2018 10.5* 12.0 - 16.0 g/dL Final   • Hematocrit 08/28/2018 32.7* 37.0 - 47.0 % Final   • MCV 08/28/2018 92.9  80.0 - 94.0 fL Final   • MCH 08/28/2018 29.8  27.0 - 33.0 pg Final   • MCHC 08/28/2018 32.1* 33.0 - 37.0 g/dL Final   • RDW 08/28/2018 16.0* 11.5 - 14.5 % Final   • RDW-SD 08/28/2018 52.4  37.0 - 54.0 fl Final   • MPV 08/28/2018 10.1* 6.0 - 10.0 fL Final   • Platelets 08/28/2018 231  130 - 400 10*3/mm3 Final   • " Neutrophil % 08/28/2018 38.9* 40.0 - 75.0 % Final   • Lymphocyte % 08/28/2018 38.2  16.0 - 46.0 % Final   • Monocyte % 08/28/2018 19.4* 0.0 - 12.0 % Final   • Eosinophil % 08/28/2018 2.2  0.0 - 7.0 % Final   • Basophil % 08/28/2018 1.0  0.0 - 2.0 % Final   • Immature Grans % 08/28/2018 0.3  0.0 - 0.5 % Final   • Neutrophils, Absolute 08/28/2018 2.82  1.40 - 6.50 10*3/mm3 Final   • Lymphocytes, Absolute 08/28/2018 2.76  1.00 - 3.00 10*3/mm3 Final   • Monocytes, Absolute 08/28/2018 1.40* 0.10 - 0.90 10*3/mm3 Final   • Eosinophils, Absolute 08/28/2018 0.16  0.00 - 0.70 10*3/mm3 Final   • Basophils, Absolute 08/28/2018 0.07  0.00 - 0.30 10*3/mm3 Final   • Immature Grans, Absolute 08/28/2018 0.02  0.00 - 0.03 10*3/mm3 Final   • Osmolality Calc 08/28/2018 280.0  273.0 - 305.0 mOsm/kg Final       Physical Exam   Constitutional: She is oriented to person, place, and time. She appears well-developed and well-nourished.   HENT:   Head: Normocephalic and atraumatic.   Nose: Nose normal.   Mouth/Throat: Oropharynx is clear and moist.   Eyes: Conjunctivae and EOM are normal. Pupils are equal, round, and reactive to light.   Neck: Normal range of motion. Neck supple. No tracheal deviation present. No thyromegaly present.   Cardiovascular: Normal rate, regular rhythm, normal heart sounds and intact distal pulses.   No murmur heard.  Pulmonary/Chest: Effort normal. No respiratory distress. She has wheezes (diffusely throughout bilateral lung fields).   Abdominal: Soft. Bowel sounds are normal. There is no tenderness. There is no guarding.   Musculoskeletal: Normal range of motion. She exhibits no edema or tenderness.   Lymphadenopathy:     She has no cervical adenopathy.   Neurological: She is alert and oriented to person, place, and time.   Skin: Skin is warm and dry. No rash noted.   Psychiatric: She has a normal mood and affect. Her behavior is normal.   Nursing note and vitals reviewed.      Assessment/Plan     Diagnoses and  all orders for this visit:    COPD exacerbation (CMS/HCC)  Comments:  We discussed repeating CT chest since her low dose CT chest showed nodule last year.  Orders:  -     doxycycline (VIBRAMYCIN) 100 MG capsule; Take 1 capsule by mouth 2 (Two) Times a Day for 10 days.  -     predniSONE (DELTASONE) 20 MG tablet; Take 1 tablet by mouth See Admin Instructions. 4 tabs qd for 3 days, 3 tb qd for 3 day, 2 tb qd x3 days, 1 tb qd x 3 dy, 1/2 tb qd for 4 day    Mixed hyperlipidemia  Comments:  continue tricor and lipitor    Essential hypertension  Comments:  continue lasix and metoprolol    Pain of right lower extremity  Comments:  We discussed doing NCS right leg in the future since she is currently acutely ill.                 This document has been electronically signed by:  Birdie Shipley PA-C

## 2019-04-26 ENCOUNTER — OFFICE VISIT (OUTPATIENT)
Dept: FAMILY MEDICINE CLINIC | Facility: CLINIC | Age: 69
End: 2019-04-26

## 2019-04-26 VITALS
OXYGEN SATURATION: 95 % | DIASTOLIC BLOOD PRESSURE: 80 MMHG | BODY MASS INDEX: 21.8 KG/M2 | HEART RATE: 88 BPM | SYSTOLIC BLOOD PRESSURE: 140 MMHG | HEIGHT: 65 IN | TEMPERATURE: 98.4 F

## 2019-04-26 DIAGNOSIS — I50.42 CHRONIC COMBINED SYSTOLIC AND DIASTOLIC CONGESTIVE HEART FAILURE (HCC): ICD-10-CM

## 2019-04-26 DIAGNOSIS — E78.2 MIXED HYPERLIPIDEMIA: ICD-10-CM

## 2019-04-26 DIAGNOSIS — R42 VERTIGO: Primary | ICD-10-CM

## 2019-04-26 DIAGNOSIS — S20.221A CONTUSION OF RIGHT SIDE OF BACK, INITIAL ENCOUNTER: ICD-10-CM

## 2019-04-26 DIAGNOSIS — I10 ESSENTIAL HYPERTENSION: ICD-10-CM

## 2019-04-26 PROCEDURE — 96372 THER/PROPH/DIAG INJ SC/IM: CPT | Performed by: PHYSICIAN ASSISTANT

## 2019-04-26 PROCEDURE — 99214 OFFICE O/P EST MOD 30 MIN: CPT | Performed by: PHYSICIAN ASSISTANT

## 2019-04-26 RX ORDER — KETOROLAC TROMETHAMINE 30 MG/ML
60 INJECTION, SOLUTION INTRAMUSCULAR; INTRAVENOUS ONCE
Status: COMPLETED | OUTPATIENT
Start: 2019-04-26 | End: 2019-04-26

## 2019-04-26 RX ORDER — MECLIZINE HYDROCHLORIDE 25 MG/1
25 TABLET ORAL 3 TIMES DAILY PRN
Qty: 90 TABLET | Refills: 2 | Status: SHIPPED | OUTPATIENT
Start: 2019-04-26

## 2019-04-26 RX ORDER — METHYLPREDNISOLONE ACETATE 80 MG/ML
80 INJECTION, SUSPENSION INTRA-ARTICULAR; INTRALESIONAL; INTRAMUSCULAR; SOFT TISSUE ONCE
Status: COMPLETED | OUTPATIENT
Start: 2019-04-26 | End: 2019-04-26

## 2019-04-26 RX ADMIN — KETOROLAC TROMETHAMINE 60 MG: 30 INJECTION, SOLUTION INTRAMUSCULAR; INTRAVENOUS at 12:53

## 2019-04-26 RX ADMIN — METHYLPREDNISOLONE ACETATE 80 MG: 80 INJECTION, SUSPENSION INTRA-ARTICULAR; INTRALESIONAL; INTRAMUSCULAR; SOFT TISSUE at 12:54

## 2019-04-26 NOTE — PROGRESS NOTES
Subjective   Michaela Hutchison is a 68 y.o. female.       Chief Complaint -dizziness    History of Present Illness -      Dizziness-  Her daughter-in-law is with her today.  She complains of being off balance with room spinning/dizziness for the past 3 days.  She has had several falls in that time..  She complains of bruising her left shoulder left and right back due to falls within the past 3 days.  She reports that she does not think that she has fractured any bones.    Hyperlipidemia-improved with Lipitor  Lab Results   Component Value Date    CHOL 117 04/27/2017    TRIG 137 04/27/2017    HDL 50 (L) 04/27/2017    LDL 40 04/27/2017     Hypertension- controlled with Lasix and metoprolol    Chronic congestive heart failure-stable with Lasix.  She denies any shortness of breath or lower extremity edema.    The following portions of the patient's history were reviewed and updated as appropriate: allergies, current medications, past family history, past medical history, past social history, past surgical history and problem list.    Review of Systems   Constitutional: Negative for activity change, appetite change and fever.   HENT: Negative for ear pain, sinus pressure and sore throat.    Eyes: Negative for pain and visual disturbance.   Respiratory: Negative for cough and chest tightness.    Cardiovascular: Negative for chest pain and palpitations.   Gastrointestinal: Negative for abdominal pain, constipation, diarrhea, nausea and vomiting.   Endocrine: Negative for polydipsia and polyuria.   Genitourinary: Negative for dysuria and frequency.   Musculoskeletal: Negative for back pain and myalgias.   Skin: Negative for color change and rash.   Allergic/Immunologic: Negative for food allergies and immunocompromised state.   Neurological: Positive for dizziness and weakness. Negative for seizures, syncope and headaches.        Frequent falls   Hematological: Negative for adenopathy. Does not bruise/bleed easily.  "  Psychiatric/Behavioral: Negative for hallucinations and suicidal ideas. The patient is not nervous/anxious.        /80   Pulse 88   Temp 98.4 °F (36.9 °C) (Oral)   Ht 165.1 cm (65\")   SpO2 95%   BMI 21.80 kg/m²   Admission on 08/28/2018, Discharged on 08/28/2018   Component Date Value Ref Range Status   • Glucose 08/28/2018 96  70 - 110 mg/dL Final   • BUN 08/28/2018 20  7 - 21 mg/dL Final   • Creatinine 08/28/2018 1.33* 0.43 - 1.29 mg/dL Final   • Sodium 08/28/2018 139  135 - 153 mmol/L Final   • Potassium 08/28/2018 4.1  3.5 - 5.3 mmol/L Final   • Chloride 08/28/2018 111  99 - 112 mmol/L Final   • CO2 08/28/2018 24.3  24.3 - 31.9 mmol/L Final   • Calcium 08/28/2018 8.9  7.7 - 10.0 mg/dL Final   • Total Protein 08/28/2018 7.7  6.0 - 8.0 g/dL Final   • Albumin 08/28/2018 4.30  3.40 - 4.80 g/dL Final   • ALT (SGPT) 08/28/2018 13  10 - 36 U/L Final   • AST (SGOT) 08/28/2018 30  10 - 30 U/L Final   • Alkaline Phosphatase 08/28/2018 88  35 - 104 U/L Final   • Total Bilirubin 08/28/2018 0.2  0.2 - 1.8 mg/dL Final   • eGFR Non African Amer 08/28/2018 40* >60 mL/min/1.73 Final   • Globulin 08/28/2018 3.4  gm/dL Final   • A/G Ratio 08/28/2018 1.3* 1.5 - 2.5 g/dL Final   • BUN/Creatinine Ratio 08/28/2018 15.0  7.0 - 25.0 Final   • Anion Gap 08/28/2018 3.7  3.6 - 11.2 mmol/L Final   • Protime 08/28/2018 14.9  11.0 - 15.4 Seconds Final   • INR 08/28/2018 1.14* 0.90 - 1.10 Final   • WBC 08/28/2018 7.23  4.50 - 12.50 10*3/mm3 Final   • RBC 08/28/2018 3.52* 4.20 - 5.40 10*6/mm3 Final   • Hemoglobin 08/28/2018 10.5* 12.0 - 16.0 g/dL Final   • Hematocrit 08/28/2018 32.7* 37.0 - 47.0 % Final   • MCV 08/28/2018 92.9  80.0 - 94.0 fL Final   • MCH 08/28/2018 29.8  27.0 - 33.0 pg Final   • MCHC 08/28/2018 32.1* 33.0 - 37.0 g/dL Final   • RDW 08/28/2018 16.0* 11.5 - 14.5 % Final   • RDW-SD 08/28/2018 52.4  37.0 - 54.0 fl Final   • MPV 08/28/2018 10.1* 6.0 - 10.0 fL Final   • Platelets 08/28/2018 231  130 - 400 10*3/mm3 Final "   • Neutrophil % 08/28/2018 38.9* 40.0 - 75.0 % Final   • Lymphocyte % 08/28/2018 38.2  16.0 - 46.0 % Final   • Monocyte % 08/28/2018 19.4* 0.0 - 12.0 % Final   • Eosinophil % 08/28/2018 2.2  0.0 - 7.0 % Final   • Basophil % 08/28/2018 1.0  0.0 - 2.0 % Final   • Immature Grans % 08/28/2018 0.3  0.0 - 0.5 % Final   • Neutrophils, Absolute 08/28/2018 2.82  1.40 - 6.50 10*3/mm3 Final   • Lymphocytes, Absolute 08/28/2018 2.76  1.00 - 3.00 10*3/mm3 Final   • Monocytes, Absolute 08/28/2018 1.40* 0.10 - 0.90 10*3/mm3 Final   • Eosinophils, Absolute 08/28/2018 0.16  0.00 - 0.70 10*3/mm3 Final   • Basophils, Absolute 08/28/2018 0.07  0.00 - 0.30 10*3/mm3 Final   • Immature Grans, Absolute 08/28/2018 0.02  0.00 - 0.03 10*3/mm3 Final   • Osmolality Calc 08/28/2018 280.0  273.0 - 305.0 mOsm/kg Final       Physical Exam   Constitutional: She is oriented to person, place, and time. She appears well-developed and well-nourished.   HENT:   Head: Normocephalic and atraumatic.   Nose: Nose normal.   Mouth/Throat: Oropharynx is clear and moist.   Minimal amount of clear fluid noted behind TMs bilaterally.   Eyes: Conjunctivae and EOM are normal. Pupils are equal, round, and reactive to light.   Neck: Normal range of motion. Neck supple. No tracheal deviation present. No thyromegaly present.   Cardiovascular: Normal rate, regular rhythm, normal heart sounds and intact distal pulses.   No murmur heard.  Pulmonary/Chest: Effort normal and breath sounds normal. No respiratory distress. She has no wheezes.   Abdominal: Soft. Bowel sounds are normal. There is no tenderness. There is no guarding.   Musculoskeletal: She exhibits tenderness. She exhibits no edema.   She is using walker to ambulate today and still requires assistance by her daughter-in-law.  Purple contusion approximately 10 x 12 cm right thoracic back noted.  Approximately 2 x 6 cm excoriation noted left back.  Tenderness noted left shoulder minimally without contusion.    Lymphadenopathy:     She has no cervical adenopathy.   Neurological: She is alert and oriented to person, place, and time. No cranial nerve deficit or sensory deficit. She exhibits normal muscle tone. Coordination normal.   Skin: Skin is warm and dry. No rash noted.   Psychiatric: She has a normal mood and affect. Her behavior is normal.   Nursing note and vitals reviewed.      Assessment/Plan     Diagnoses and all orders for this visit:    Vertigo  -     meclizine (ANTIVERT) 25 MG tablet; Take 1 tablet by mouth 3 (Three) Times a Day As Needed for dizziness.  -     Ambulatory Referral to ENT (Otolaryngology)  -     methylPREDNISolone acetate (DEPO-medrol) injection 80 mg    Contusion of right side of back, initial encounter  -     ketorolac (TORADOL) injection 60 mg    Mixed hyperlipidemia  Comments:  Continue Lipitor    Essential hypertension  Comments:  Continue Lasix and metoprolol    Chronic combined systolic and diastolic congestive heart failure (CMS/HCC)  Comments:  Continue Lasix.  She was advised to report any new symptoms such as shortness of breath or shortness of breath when supine.    She was unable to urinate for urinalysis today.  We discussed several different reasons for vertigo which included urinary tract infection, congestive heart failure, and inner ear issue.  She was advised to report any persistence of symptoms or syncope.             This document has been electronically signed by:  Birdie Shipley PA-C

## 2019-04-29 ENCOUNTER — HOSPITAL ENCOUNTER (EMERGENCY)
Facility: HOSPITAL | Age: 69
Discharge: HOME | End: 2019-04-29
Attending: EMERGENCY MEDICINE

## 2019-04-29 ENCOUNTER — APPOINTMENT (OUTPATIENT)
Dept: CT IMAGING | Facility: HOSPITAL | Age: 69
End: 2019-04-29

## 2019-04-29 ENCOUNTER — APPOINTMENT (OUTPATIENT)
Dept: GENERAL RADIOLOGY | Facility: HOSPITAL | Age: 69
End: 2019-04-29

## 2019-04-29 VITALS
TEMPERATURE: 98.2 F | HEIGHT: 65 IN | WEIGHT: 125 LBS | OXYGEN SATURATION: 98 % | DIASTOLIC BLOOD PRESSURE: 76 MMHG | BODY MASS INDEX: 20.83 KG/M2 | SYSTOLIC BLOOD PRESSURE: 136 MMHG | HEART RATE: 74 BPM | RESPIRATION RATE: 20 BRPM

## 2019-04-29 DIAGNOSIS — J44.1 COPD WITH ACUTE EXACERBATION (HCC): Primary | ICD-10-CM

## 2019-04-29 PROCEDURE — 99284 EMERGENCY DEPT VISIT MOD MDM: CPT

## 2019-04-29 PROCEDURE — 70450 CT HEAD/BRAIN W/O DYE: CPT | Performed by: RADIOLOGY

## 2019-04-29 PROCEDURE — 70450 CT HEAD/BRAIN W/O DYE: CPT

## 2019-04-29 PROCEDURE — 94799 UNLISTED PULMONARY SVC/PX: CPT

## 2019-04-29 PROCEDURE — 94640 AIRWAY INHALATION TREATMENT: CPT

## 2019-04-29 PROCEDURE — 73502 X-RAY EXAM HIP UNI 2-3 VIEWS: CPT | Performed by: RADIOLOGY

## 2019-04-29 PROCEDURE — 73502 X-RAY EXAM HIP UNI 2-3 VIEWS: CPT

## 2019-04-29 PROCEDURE — 25010000003 PHENYTOIN PER 50 MG: Performed by: EMERGENCY MEDICINE

## 2019-04-29 RX ORDER — PHENYTOIN SODIUM 50 MG/ML
15 INJECTION, SOLUTION INTRAMUSCULAR; INTRAVENOUS ONCE
Status: DISCONTINUED | OUTPATIENT
Start: 2019-04-29 | End: 2019-04-29

## 2019-04-29 RX ORDER — IPRATROPIUM BROMIDE AND ALBUTEROL SULFATE 2.5; .5 MG/3ML; MG/3ML
3 SOLUTION RESPIRATORY (INHALATION) ONCE
Status: COMPLETED | OUTPATIENT
Start: 2019-04-29 | End: 2019-04-29

## 2019-04-29 RX ADMIN — IPRATROPIUM BROMIDE AND ALBUTEROL SULFATE 3 ML: .5; 3 SOLUTION RESPIRATORY (INHALATION) at 22:10

## 2019-04-30 ENCOUNTER — EPISODE CHANGES (OUTPATIENT)
Dept: CASE MANAGEMENT | Facility: OTHER | Age: 69
End: 2019-04-30

## 2019-04-30 ENCOUNTER — PATIENT OUTREACH (OUTPATIENT)
Dept: CASE MANAGEMENT | Facility: OTHER | Age: 69
End: 2019-04-30

## 2019-05-01 NOTE — ED PROVIDER NOTES
Subjective   Patient presents to ER with shortness of breath and generalized weakness.        Shortness of Breath   Severity:  Moderate  Onset quality:  Gradual  Timing:  Constant  Progression:  Worsening  Chronicity:  Chronic  Context: activity    Relieved by:  Rest  Worsened by:  Exertion and movement  Ineffective treatments:  None tried  Associated symptoms: wheezing        Review of Systems   Constitutional: Positive for activity change.   HENT: Negative.    Eyes: Negative.    Respiratory: Positive for shortness of breath and wheezing.    Cardiovascular: Negative.    Gastrointestinal: Negative.    Endocrine: Negative.    Genitourinary: Negative.    Musculoskeletal: Negative.    Skin: Negative.    Allergic/Immunologic: Negative.    Neurological: Negative.    Hematological: Negative.    Psychiatric/Behavioral: Negative.        Past Medical History:   Diagnosis Date   • Anxiety    • Cancer (CMS/HCC)     skin cancer   • CHF (congestive heart failure) (CMS/HCC)    • COPD (chronic obstructive pulmonary disease) (CMS/HCC)    • Coronary artery disease    • Disease of thyroid gland    • History of transfusion    • Hypertension    • Panic attack    • Peptic ulcer hemorrhagic    • PONV (postoperative nausea and vomiting)    • Seizure (CMS/HCC)    • Stroke (CMS/HCC)     TIA       No Known Allergies    Past Surgical History:   Procedure Laterality Date   • CARDIAC PACEMAKER PLACEMENT  2012   • CARDIAC SURGERY     • CHOLECYSTECTOMY WITH INTRAOPERATIVE CHOLANGIOGRAM N/A 9/27/2016    Procedure: CHOLECYSTECTOMY LAPAROSCOPIC INTRAOPERATIVE CHOLANGIOGRAM;  Surgeon: Gonzalez Marquis MD;  Location: [facility] OR;  Service:    • CORONARY ANGIOPLASTY WITH STENT PLACEMENT  2011 12 cardiac stents   • ESOPHAGOSCOPY / EGD  2001   • FRACTURE SURGERY     • HYSTERECTOMY     • SHOULDER SURGERY     • VASCULAR SURGERY         No family history on file.    Social History     Socioeconomic History   • Marital status:      Spouse name: Not  on file   • Number of children: Not on file   • Years of education: Not on file   • Highest education level: Not on file   Social Needs   • Financial resource strain: Patient refused   • Food insecurity:     Worry: Patient refused     Inability: Patient refused   • Transportation needs:     Medical: Patient refused     Non-medical: Patient refused   Tobacco Use   • Smoking status: Current Every Day Smoker     Packs/day: 1.00   • Smokeless tobacco: Never Used   Substance and Sexual Activity   • Alcohol use: No   • Drug use: No   • Sexual activity: Defer   Lifestyle   • Physical activity:     Days per week: Patient refused     Minutes per session: Patient refused   • Stress: Patient refused           Objective   Physical Exam   Constitutional: She appears well-developed and well-nourished.   HENT:   Head: Normocephalic and atraumatic.   Eyes: EOM are normal. Pupils are equal, round, and reactive to light.   Neck: Normal range of motion.   Cardiovascular: Normal rate and regular rhythm.   Pulmonary/Chest: She has wheezes.   Abdominal: Soft.   Musculoskeletal: Normal range of motion.   Neurological: She is alert.   Skin: Skin is warm.   Psychiatric: She has a normal mood and affect.   Nursing note and vitals reviewed.      Procedures           ED Course                  MDM      Final diagnoses:   COPD with acute exacerbation (CMS/[facility])            Tima Burnham MD  05/01/19 7255

## 2019-05-10 ENCOUNTER — TRANSCRIBE ORDERS (OUTPATIENT)
Dept: INFUSION THERAPY | Facility: HOSPITAL | Age: 69
End: 2019-05-10

## 2019-05-10 DIAGNOSIS — G40.802 CURSIVE EPILEPSY (HCC): ICD-10-CM

## 2019-05-10 DIAGNOSIS — R11.2 NAUSEA AND VOMITING, INTRACTABILITY OF VOMITING NOT SPECIFIED, UNSPECIFIED VOMITING TYPE: Primary | ICD-10-CM

## 2019-05-10 DIAGNOSIS — R10.9 STOMACH ACHE: ICD-10-CM

## 2019-05-16 ENCOUNTER — APPOINTMENT (OUTPATIENT)
Dept: INFUSION THERAPY | Facility: HOSPITAL | Age: 69
End: 2019-05-16

## 2019-05-17 RX ORDER — METOPROLOL SUCCINATE 25 MG/1
25 TABLET, EXTENDED RELEASE ORAL DAILY
Qty: 30 TABLET | Refills: 2 | Status: SHIPPED | OUTPATIENT
Start: 2019-05-17 | End: 2019-12-24

## 2019-05-17 RX ORDER — FENOFIBRATE 200 MG/1
200 CAPSULE ORAL
Qty: 30 CAPSULE | Refills: 0 | Status: SHIPPED | OUTPATIENT
Start: 2019-05-17 | End: 2019-05-17 | Stop reason: SDUPTHER

## 2019-05-20 RX ORDER — FENOFIBRATE 200 MG/1
200 CAPSULE ORAL
Qty: 90 CAPSULE | Refills: 0 | Status: SHIPPED | OUTPATIENT
Start: 2019-05-20 | End: 2019-10-24 | Stop reason: SDUPTHER

## 2019-07-05 DIAGNOSIS — G40.309 NONINTRACTABLE GENERALIZED IDIOPATHIC EPILEPSY WITHOUT STATUS EPILEPTICUS (HCC): ICD-10-CM

## 2019-07-05 DIAGNOSIS — E78.2 MIXED HYPERLIPIDEMIA: ICD-10-CM

## 2019-07-05 DIAGNOSIS — K21.00 GASTROESOPHAGEAL REFLUX DISEASE WITH ESOPHAGITIS: ICD-10-CM

## 2019-07-05 DIAGNOSIS — F33.41 RECURRENT MAJOR DEPRESSIVE DISORDER, IN PARTIAL REMISSION (HCC): ICD-10-CM

## 2019-07-05 DIAGNOSIS — E03.9 ACQUIRED HYPOTHYROIDISM: ICD-10-CM

## 2019-07-05 RX ORDER — CITALOPRAM 20 MG/1
20 TABLET ORAL DAILY
Qty: 90 TABLET | Refills: 0 | Status: SHIPPED | OUTPATIENT
Start: 2019-07-05 | End: 2019-10-29

## 2019-07-05 RX ORDER — LEVOTHYROXINE SODIUM 0.03 MG/1
25 TABLET ORAL DAILY
Qty: 90 TABLET | Refills: 0 | Status: SHIPPED | OUTPATIENT
Start: 2019-07-05 | End: 2019-10-24 | Stop reason: SDUPTHER

## 2019-07-05 RX ORDER — POTASSIUM CHLORIDE 750 MG/1
10 TABLET, FILM COATED, EXTENDED RELEASE ORAL DAILY
Qty: 90 TABLET | Refills: 0 | Status: SHIPPED | OUTPATIENT
Start: 2019-07-05 | End: 2020-01-14

## 2019-07-05 RX ORDER — RANITIDINE 150 MG/1
300 TABLET ORAL DAILY
Qty: 180 TABLET | Refills: 0 | Status: SHIPPED | OUTPATIENT
Start: 2019-07-05 | End: 2020-09-25

## 2019-07-05 RX ORDER — PHENYTOIN SODIUM 100 MG/1
CAPSULE, EXTENDED RELEASE ORAL
Qty: 450 CAPSULE | Refills: 0 | Status: SHIPPED | OUTPATIENT
Start: 2019-07-05 | End: 2020-01-14

## 2019-07-05 RX ORDER — ESOMEPRAZOLE MAGNESIUM 40 MG/1
40 CAPSULE, DELAYED RELEASE ORAL
Qty: 90 CAPSULE | Refills: 0 | Status: SHIPPED | OUTPATIENT
Start: 2019-07-05 | End: 2019-10-24 | Stop reason: SDUPTHER

## 2019-07-05 RX ORDER — ATORVASTATIN CALCIUM 40 MG/1
40 TABLET, FILM COATED ORAL DAILY
Qty: 90 TABLET | Refills: 0 | Status: SHIPPED | OUTPATIENT
Start: 2019-07-05 | End: 2019-11-26 | Stop reason: SDUPTHER

## 2019-07-05 RX ORDER — VENLAFAXINE 75 MG/1
TABLET ORAL
Qty: 180 TABLET | Refills: 0 | Status: SHIPPED | OUTPATIENT
Start: 2019-07-05 | End: 2019-10-29

## 2019-10-24 DIAGNOSIS — K21.00 GASTROESOPHAGEAL REFLUX DISEASE WITH ESOPHAGITIS: ICD-10-CM

## 2019-10-24 DIAGNOSIS — E03.9 ACQUIRED HYPOTHYROIDISM: ICD-10-CM

## 2019-10-24 DIAGNOSIS — I25.118 CORONARY ARTERY DISEASE OF NATIVE ARTERY OF NATIVE HEART WITH STABLE ANGINA PECTORIS (HCC): ICD-10-CM

## 2019-10-25 RX ORDER — CLOPIDOGREL BISULFATE 75 MG/1
75 TABLET ORAL DAILY
Qty: 90 TABLET | Refills: 0 | Status: SHIPPED | OUTPATIENT
Start: 2019-10-25 | End: 2019-11-26 | Stop reason: SDUPTHER

## 2019-10-25 RX ORDER — LEVOTHYROXINE SODIUM 0.03 MG/1
25 TABLET ORAL DAILY
Qty: 90 TABLET | Refills: 0 | Status: SHIPPED | OUTPATIENT
Start: 2019-10-25 | End: 2019-11-26 | Stop reason: SDUPTHER

## 2019-10-25 RX ORDER — ESOMEPRAZOLE MAGNESIUM 40 MG/1
40 CAPSULE, DELAYED RELEASE ORAL
Qty: 90 CAPSULE | Refills: 0 | Status: SHIPPED | OUTPATIENT
Start: 2019-10-25 | End: 2019-11-26 | Stop reason: SDUPTHER

## 2019-10-25 RX ORDER — FENOFIBRATE 200 MG/1
200 CAPSULE ORAL
Qty: 90 CAPSULE | Refills: 0 | Status: SHIPPED | OUTPATIENT
Start: 2019-10-25 | End: 2019-11-26 | Stop reason: SDUPTHER

## 2019-10-29 ENCOUNTER — OFFICE VISIT (OUTPATIENT)
Dept: FAMILY MEDICINE CLINIC | Facility: CLINIC | Age: 69
End: 2019-10-29

## 2019-10-29 VITALS
HEART RATE: 110 BPM | WEIGHT: 124 LBS | SYSTOLIC BLOOD PRESSURE: 160 MMHG | BODY MASS INDEX: 20.66 KG/M2 | DIASTOLIC BLOOD PRESSURE: 90 MMHG | OXYGEN SATURATION: 96 % | HEIGHT: 65 IN | TEMPERATURE: 97.8 F

## 2019-10-29 DIAGNOSIS — I25.118 CORONARY ARTERY DISEASE OF NATIVE ARTERY OF NATIVE HEART WITH STABLE ANGINA PECTORIS (HCC): ICD-10-CM

## 2019-10-29 DIAGNOSIS — F41.1 GAD (GENERALIZED ANXIETY DISORDER): ICD-10-CM

## 2019-10-29 DIAGNOSIS — I10 ESSENTIAL HYPERTENSION: ICD-10-CM

## 2019-10-29 DIAGNOSIS — G25.81 RLS (RESTLESS LEGS SYNDROME): ICD-10-CM

## 2019-10-29 DIAGNOSIS — G40.309 NONINTRACTABLE GENERALIZED IDIOPATHIC EPILEPSY WITHOUT STATUS EPILEPTICUS (HCC): ICD-10-CM

## 2019-10-29 DIAGNOSIS — M79.604 PAIN OF RIGHT LOWER EXTREMITY: Primary | ICD-10-CM

## 2019-10-29 DIAGNOSIS — F33.2 SEVERE EPISODE OF RECURRENT MAJOR DEPRESSIVE DISORDER, WITHOUT PSYCHOTIC FEATURES (HCC): ICD-10-CM

## 2019-10-29 PROCEDURE — 99214 OFFICE O/P EST MOD 30 MIN: CPT | Performed by: PHYSICIAN ASSISTANT

## 2019-10-29 RX ORDER — CITALOPRAM 40 MG/1
40 TABLET ORAL DAILY
Qty: 30 TABLET | Refills: 5 | Status: SHIPPED | OUTPATIENT
Start: 2019-10-29 | End: 2019-11-22 | Stop reason: SDUPTHER

## 2019-10-29 RX ORDER — ROPINIROLE 0.25 MG/1
0.25 TABLET, FILM COATED ORAL SEE ADMIN INSTRUCTIONS
Qty: 120 TABLET | Refills: 0 | Status: SHIPPED | OUTPATIENT
Start: 2019-10-29 | End: 2019-10-29 | Stop reason: SDUPTHER

## 2019-10-31 RX ORDER — ROPINIROLE 0.25 MG/1
TABLET, FILM COATED ORAL
Qty: 360 TABLET | Refills: 0 | Status: SHIPPED | OUTPATIENT
Start: 2019-10-31 | End: 2019-11-26 | Stop reason: SDUPTHER

## 2019-11-02 NOTE — PROGRESS NOTES
Subjective   Michaela Hutchison is a 69 y.o. female.       Chief Complaint -leg pain    History of Present Illness -      Leg pain-  She complains of leg pain in the right lower leg.  Pain is described as a moderate burning pain.  She states that she has received epidural injections in the past and was told that she had nerve damage to her right leg.  Some relief with gabapentin in the past.  Onset greater than 1 year.    Depression-  She complains of increased stress and depressive symptoms.  She states that her boyfriend passed away as well as her nephew earlier this year.  She states her son is currently in the hospital and has been diagnosed with leukemia.  Minimal relief with Celexa 20 mg daily.  She denies any SI or HI.    Anxiety-  Not at goal due to increased stress with recent stressors as described above.  She describes nervousness and feeling anxious as well as insomnia.    Leg movements-  Involuntarily awakening her from sleep at night.  Onset greater than 6 months.  Not at all    Coronary artery disease-stable with risk factor modification including Lipitor and Plavix    Epilepsy- stable with Dilantin.    Hypertension- not at goal today due to increased stress and worry.  She is emotionally upset today which may account for elevated blood pressure at today's visit.  She reports home blood pressure is usually less than 130/80 with the use of Lasix and metoprolol.    The following portions of the patient's history were reviewed and updated as appropriate: allergies, current medications, past family history, past medical history, past social history, past surgical history and problem list.    Review of Systems   Constitutional: Negative for activity change, appetite change and fever.   HENT: Negative for ear pain, sinus pressure and sore throat.    Eyes: Negative for pain and visual disturbance.   Respiratory: Negative for cough and chest tightness.    Cardiovascular: Negative for chest pain and palpitations.  "  Gastrointestinal: Negative for abdominal pain, constipation, diarrhea, nausea and vomiting.   Endocrine: Negative for polydipsia and polyuria.   Genitourinary: Negative for dysuria and frequency.   Musculoskeletal: Negative for back pain and myalgias.        Leg pain and movements   Skin: Negative for color change and rash.   Allergic/Immunologic: Negative for food allergies and immunocompromised state.   Neurological: Negative for dizziness, syncope and headaches.   Hematological: Negative for adenopathy. Does not bruise/bleed easily.   Psychiatric/Behavioral: Positive for dysphoric mood and sleep disturbance. Negative for hallucinations, self-injury and suicidal ideas. The patient is nervous/anxious. The patient is not hyperactive.        /90   Pulse 110   Temp 97.8 °F (36.6 °C) (Temporal)   Ht 165.1 cm (65\")   Wt 56.2 kg (124 lb)   SpO2 96%   BMI 20.63 kg/m²   Admission on 08/28/2018, Discharged on 08/28/2018   Component Date Value Ref Range Status   • Glucose 08/28/2018 96  70 - 110 mg/dL Final   • BUN 08/28/2018 20  7 - 21 mg/dL Final   • Creatinine 08/28/2018 1.33* 0.43 - 1.29 mg/dL Final   • Sodium 08/28/2018 139  135 - 153 mmol/L Final   • Potassium 08/28/2018 4.1  3.5 - 5.3 mmol/L Final   • Chloride 08/28/2018 111  99 - 112 mmol/L Final   • CO2 08/28/2018 24.3  24.3 - 31.9 mmol/L Final   • Calcium 08/28/2018 8.9  7.7 - 10.0 mg/dL Final   • Total Protein 08/28/2018 7.7  6.0 - 8.0 g/dL Final   • Albumin 08/28/2018 4.30  3.40 - 4.80 g/dL Final   • ALT (SGPT) 08/28/2018 13  10 - 36 U/L Final   • AST (SGOT) 08/28/2018 30  10 - 30 U/L Final   • Alkaline Phosphatase 08/28/2018 88  35 - 104 U/L Final   • Total Bilirubin 08/28/2018 0.2  0.2 - 1.8 mg/dL Final   • eGFR Non African Amer 08/28/2018 40* >60 mL/min/1.73 Final   • Globulin 08/28/2018 3.4  gm/dL Final   • A/G Ratio 08/28/2018 1.3* 1.5 - 2.5 g/dL Final   • BUN/Creatinine Ratio 08/28/2018 15.0  7.0 - 25.0 Final   • Anion Gap 08/28/2018 3.7  3.6 " - 11.2 mmol/L Final   • Protime 08/28/2018 14.9  11.0 - 15.4 Seconds Final   • INR 08/28/2018 1.14* 0.90 - 1.10 Final   • WBC 08/28/2018 7.23  4.50 - 12.50 10*3/mm3 Final   • RBC 08/28/2018 3.52* 4.20 - 5.40 10*6/mm3 Final   • Hemoglobin 08/28/2018 10.5* 12.0 - 16.0 g/dL Final   • Hematocrit 08/28/2018 32.7* 37.0 - 47.0 % Final   • MCV 08/28/2018 92.9  80.0 - 94.0 fL Final   • MCH 08/28/2018 29.8  27.0 - 33.0 pg Final   • MCHC 08/28/2018 32.1* 33.0 - 37.0 g/dL Final   • RDW 08/28/2018 16.0* 11.5 - 14.5 % Final   • RDW-SD 08/28/2018 52.4  37.0 - 54.0 fl Final   • MPV 08/28/2018 10.1* 6.0 - 10.0 fL Final   • Platelets 08/28/2018 231  130 - 400 10*3/mm3 Final   • Neutrophil % 08/28/2018 38.9* 40.0 - 75.0 % Final   • Lymphocyte % 08/28/2018 38.2  16.0 - 46.0 % Final   • Monocyte % 08/28/2018 19.4* 0.0 - 12.0 % Final   • Eosinophil % 08/28/2018 2.2  0.0 - 7.0 % Final   • Basophil % 08/28/2018 1.0  0.0 - 2.0 % Final   • Immature Grans % 08/28/2018 0.3  0.0 - 0.5 % Final   • Neutrophils, Absolute 08/28/2018 2.82  1.40 - 6.50 10*3/mm3 Final   • Lymphocytes, Absolute 08/28/2018 2.76  1.00 - 3.00 10*3/mm3 Final   • Monocytes, Absolute 08/28/2018 1.40* 0.10 - 0.90 10*3/mm3 Final   • Eosinophils, Absolute 08/28/2018 0.16  0.00 - 0.70 10*3/mm3 Final   • Basophils, Absolute 08/28/2018 0.07  0.00 - 0.30 10*3/mm3 Final   • Immature Grans, Absolute 08/28/2018 0.02  0.00 - 0.03 10*3/mm3 Final   • Osmolality Calc 08/28/2018 280.0  273.0 - 305.0 mOsm/kg Final       Physical Exam   Constitutional: She is oriented to person, place, and time. No distress.   She appears thin and tearful during today's visit.  She emotionally is unsteady today with frequent episodes of crying and concern for her son.   HENT:   Head: Normocephalic and atraumatic.   Nose: Nose normal.   Mouth/Throat: Oropharynx is clear and moist.   Eyes: Conjunctivae and EOM are normal. Pupils are equal, round, and reactive to light.   Neck: Normal range of motion. Neck  supple. No tracheal deviation present. No thyromegaly present.   Cardiovascular: Normal rate, regular rhythm, normal heart sounds and intact distal pulses.   No murmur heard.  Pulmonary/Chest: Effort normal and breath sounds normal. No respiratory distress. She has no wheezes.   Abdominal: Soft. Bowel sounds are normal. There is no tenderness. There is no guarding.   Musculoskeletal: Normal range of motion. She exhibits no edema or tenderness.   Lymphadenopathy:     She has no cervical adenopathy.   Neurological: She is alert and oriented to person, place, and time.   Skin: Skin is warm and dry. No rash noted. She is not diaphoretic.   Psychiatric: Her behavior is normal. Thought content normal.   Crying and worried   Nursing note and vitals reviewed.      Assessment/Plan     Diagnoses and all orders for this visit:    Pain of right lower extremity  Comments:  Order nerve conduction study to further evaluate this issue  Orders:  -     Nerve Conduction Test; Future    RLS (restless legs syndrome)  Comments:  Start Requip  Orders:  -     Discontinue: rOPINIRole (REQUIP) 0.25 MG tablet; Take 1 tablet by mouth See Admin Instructions. 1 tab qhs x 2 days, then 2 qhs x 2 day, then 4 qhs therafter    BRAEDEN (generalized anxiety disorder)  Comments:  Increase Celexa 40 mill grams daily  Orders:  -     citalopram (CeleXA) 40 MG tablet; Take 1 tablet by mouth Daily.  -     Ambulatory Referral to Psychiatry    Severe episode of recurrent major depressive disorder, without psychotic features (CMS/HCC)  Comments:  Increase Celexa 40 mg daily  Orders:  -     citalopram (CeleXA) 40 MG tablet; Take 1 tablet by mouth Daily.    Nonintractable generalized idiopathic epilepsy without status epilepticus (CMS/HCC)  Comments:  Continue Dilantin    Coronary artery disease of native artery of native heart with stable angina pectoris (CMS/HCC)  Comments:  Continue risk factor modification including Lipitor and Plavix    Essential  hypertension  Comments:  Continue Lasix and metoprolol.  She was advised to check blood pressure and pulse daily and bring log to visit in 1 month                 This document has been electronically signed by:  Birdie Shipley PA-C

## 2019-11-22 ENCOUNTER — OFFICE VISIT (OUTPATIENT)
Dept: PSYCHIATRY | Facility: CLINIC | Age: 69
End: 2019-11-22

## 2019-11-22 VITALS
SYSTOLIC BLOOD PRESSURE: 140 MMHG | BODY MASS INDEX: 20.57 KG/M2 | WEIGHT: 123.6 LBS | DIASTOLIC BLOOD PRESSURE: 78 MMHG | HEART RATE: 85 BPM

## 2019-11-22 DIAGNOSIS — F41.1 GAD (GENERALIZED ANXIETY DISORDER): ICD-10-CM

## 2019-11-22 DIAGNOSIS — F33.41 RECURRENT MAJOR DEPRESSIVE DISORDER, IN PARTIAL REMISSION (HCC): Primary | ICD-10-CM

## 2019-11-22 DIAGNOSIS — F33.2 SEVERE EPISODE OF RECURRENT MAJOR DEPRESSIVE DISORDER, WITHOUT PSYCHOTIC FEATURES (HCC): ICD-10-CM

## 2019-11-22 PROCEDURE — 90792 PSYCH DIAG EVAL W/MED SRVCS: CPT | Performed by: NURSE PRACTITIONER

## 2019-11-22 RX ORDER — ARIPIPRAZOLE 2 MG/1
2 TABLET ORAL DAILY
Qty: 30 TABLET | Refills: 0 | Status: SHIPPED | OUTPATIENT
Start: 2019-11-22 | End: 2020-09-25

## 2019-11-22 RX ORDER — CITALOPRAM 40 MG/1
40 TABLET ORAL DAILY
Qty: 30 TABLET | Refills: 5
Start: 2019-11-22 | End: 2020-04-30
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
## 2019-11-26 ENCOUNTER — OFFICE VISIT (OUTPATIENT)
Dept: FAMILY MEDICINE CLINIC | Facility: CLINIC | Age: 69
End: 2019-11-26

## 2019-11-26 ENCOUNTER — TELEPHONE (OUTPATIENT)
Dept: FAMILY MEDICINE CLINIC | Facility: CLINIC | Age: 69
End: 2019-11-26

## 2019-11-26 VITALS
HEART RATE: 109 BPM | SYSTOLIC BLOOD PRESSURE: 136 MMHG | WEIGHT: 124 LBS | DIASTOLIC BLOOD PRESSURE: 74 MMHG | BODY MASS INDEX: 20.66 KG/M2 | OXYGEN SATURATION: 96 % | HEIGHT: 65 IN | TEMPERATURE: 98.4 F

## 2019-11-26 DIAGNOSIS — E03.9 ACQUIRED HYPOTHYROIDISM: ICD-10-CM

## 2019-11-26 DIAGNOSIS — J44.9 COPD MIXED TYPE (HCC): ICD-10-CM

## 2019-11-26 DIAGNOSIS — M79.604 PAIN OF RIGHT LOWER EXTREMITY: ICD-10-CM

## 2019-11-26 DIAGNOSIS — K21.00 GASTROESOPHAGEAL REFLUX DISEASE WITH ESOPHAGITIS: ICD-10-CM

## 2019-11-26 DIAGNOSIS — G40.909 NONINTRACTABLE EPILEPSY WITHOUT STATUS EPILEPTICUS, UNSPECIFIED EPILEPSY TYPE (HCC): ICD-10-CM

## 2019-11-26 DIAGNOSIS — Z23 NEED FOR IMMUNIZATION AGAINST INFLUENZA: ICD-10-CM

## 2019-11-26 DIAGNOSIS — G25.81 RLS (RESTLESS LEGS SYNDROME): ICD-10-CM

## 2019-11-26 DIAGNOSIS — J42 CHRONIC BRONCHITIS, UNSPECIFIED CHRONIC BRONCHITIS TYPE (HCC): ICD-10-CM

## 2019-11-26 DIAGNOSIS — E78.2 MIXED HYPERLIPIDEMIA: ICD-10-CM

## 2019-11-26 DIAGNOSIS — I25.118 CORONARY ARTERY DISEASE OF NATIVE ARTERY OF NATIVE HEART WITH STABLE ANGINA PECTORIS (HCC): ICD-10-CM

## 2019-11-26 DIAGNOSIS — M62.838 MUSCLE SPASM: Primary | ICD-10-CM

## 2019-11-26 PROCEDURE — 90674 CCIIV4 VAC NO PRSV 0.5 ML IM: CPT | Performed by: PHYSICIAN ASSISTANT

## 2019-11-26 PROCEDURE — G0008 ADMIN INFLUENZA VIRUS VAC: HCPCS | Performed by: PHYSICIAN ASSISTANT

## 2019-11-26 PROCEDURE — 99214 OFFICE O/P EST MOD 30 MIN: CPT | Performed by: PHYSICIAN ASSISTANT

## 2019-11-26 RX ORDER — FENOFIBRATE 200 MG/1
200 CAPSULE ORAL
Qty: 90 CAPSULE | Refills: 3 | Status: SHIPPED | OUTPATIENT
Start: 2019-11-26

## 2019-11-26 RX ORDER — ATORVASTATIN CALCIUM 40 MG/1
40 TABLET, FILM COATED ORAL DAILY
Qty: 90 TABLET | Refills: 3 | Status: SHIPPED | OUTPATIENT
Start: 2019-11-26 | End: 2021-02-23

## 2019-11-26 RX ORDER — LEVOTHYROXINE SODIUM 0.03 MG/1
25 TABLET ORAL DAILY
Qty: 90 TABLET | Refills: 3 | Status: SHIPPED | OUTPATIENT
Start: 2019-11-26

## 2019-11-26 RX ORDER — ESOMEPRAZOLE MAGNESIUM 40 MG/1
40 CAPSULE, DELAYED RELEASE ORAL
Qty: 90 CAPSULE | Refills: 3 | Status: SHIPPED | OUTPATIENT
Start: 2019-11-26

## 2019-11-26 RX ORDER — CYCLOBENZAPRINE HCL 10 MG
10 TABLET ORAL 3 TIMES DAILY PRN
Qty: 90 TABLET | Refills: 0 | Status: SHIPPED | OUTPATIENT
Start: 2019-11-26 | End: 2020-09-25

## 2019-11-26 RX ORDER — CLOPIDOGREL BISULFATE 75 MG/1
75 TABLET ORAL DAILY
Qty: 90 TABLET | Refills: 3 | Status: SHIPPED | OUTPATIENT
Start: 2019-11-26 | End: 2020-12-22

## 2019-11-26 RX ORDER — ROPINIROLE 0.25 MG/1
1 TABLET, FILM COATED ORAL
Qty: 90 TABLET | Refills: 3 | Status: SHIPPED | OUTPATIENT
Start: 2019-11-26 | End: 2019-11-26

## 2019-11-26 RX ORDER — ROPINIROLE 1 MG/1
1 TABLET, FILM COATED ORAL NIGHTLY
Qty: 90 TABLET | Refills: 3 | Status: SHIPPED | OUTPATIENT
Start: 2019-11-26 | End: 2020-09-25

## 2019-11-26 RX ORDER — KETOROLAC TROMETHAMINE 30 MG/ML
60 INJECTION, SOLUTION INTRAMUSCULAR; INTRAVENOUS ONCE
Status: COMPLETED | OUTPATIENT
Start: 2019-11-26 | End: 2019-11-26

## 2019-11-26 RX ADMIN — KETOROLAC TROMETHAMINE 60 MG: 30 INJECTION, SOLUTION INTRAMUSCULAR; INTRAVENOUS at 12:03

## 2019-11-26 NOTE — PROGRESS NOTES
Subjective   Michaela Hutchison is a 69 y.o. female.       Chief Complaint -leg pain    History of Present Illness -      Leg pain-  She complains of leg pain due to nerve damage in the right lower extremity.  Onset greater than 2 years.  She states that she had leg weakness causing her to fall yesterday into her [place] cabinet hitting her right upper back.  She reports tightness in that area today.  She also had nerve conduction study/EMG since last visit but results are pending.  Some relief with gabapentin in the past.    Hyperlipidemia-improved with Lipitor  Lab Results   Component Value Date    CHOL 117 04/27/2017     Lab Results   Component Value Date    TRIG 137 04/27/2017     Lab Results   Component Value Date    HDL 50 (L) 04/27/2017     Lab Results   Component Value Date    LDL 40 04/27/2017     Coronary artery disease-stable with risk factor modification including Lipitor    Gastroesophageal reflux disease-stable with Nexium    Hypothyroidism-stable with Synthroid 25 mcg daily  Lab Results   Component Value Date    TSH 0.865 04/27/2017     Restless leg syndrome-she reports less leg pain and uncontrolled movements with the use of Requip.    COPD-not at goal.  She reports chronic lung pain and chronic cough.  She reports her sister was diagnosed with lung cancer.  She continues to smoke and is not interested in smoking cessation at this time.  Epilepsy-stable with Dilantin        The following portions of the patient's history were reviewed and updated as appropriate: allergies, current medications, past family history, past medical history, past social history, past surgical history and problem list.    Review of Systems   Constitutional: Negative for activity change, appetite change, fatigue and fever.   HENT: Negative for ear pain, sinus pressure and sore throat.    Eyes: Negative for pain and visual disturbance.   Respiratory: Positive for cough (chronic). Negative for chest tightness.    Cardiovascular:  "Negative for chest pain and palpitations.   Gastrointestinal: Negative for abdominal pain, constipation, diarrhea, nausea and vomiting.   Endocrine: Negative for polydipsia and polyuria.   Genitourinary: Negative for dysuria and frequency.   Musculoskeletal: Positive for arthralgias, back pain and myalgias.   Skin: Negative for color change and rash.   Allergic/Immunologic: Negative for food allergies and immunocompromised state.   Neurological: Negative for dizziness, syncope and headaches.   Hematological: Negative for adenopathy. Does not bruise/bleed easily.   Psychiatric/Behavioral: Positive for sleep disturbance. Negative for dysphoric mood, hallucinations and suicidal ideas. The patient is not nervous/anxious.        /74   Pulse 109   Temp 98.4 °F (36.9 °C) (Oral)   Ht 165.1 cm (65\")   Wt 56.2 kg (124 lb)   SpO2 96%   BMI 20.63 kg/m²   Admission on 08/28/2018, Discharged on 08/28/2018   Component Date Value Ref Range Status   • Glucose 08/28/2018 96  70 - 110 mg/dL Final   • BUN 08/28/2018 20  7 - 21 mg/dL Final   • Creatinine 08/28/2018 1.33* 0.43 - 1.29 mg/dL Final   • Sodium 08/28/2018 139  135 - 153 mmol/L Final   • Potassium 08/28/2018 4.1  3.5 - 5.3 mmol/L Final   • Chloride 08/28/2018 111  99 - 112 mmol/L Final   • CO2 08/28/2018 24.3  24.3 - 31.9 mmol/L Final   • Calcium 08/28/2018 8.9  7.7 - 10.0 mg/dL Final   • Total Protein 08/28/2018 7.7  6.0 - 8.0 g/dL Final   • Albumin 08/28/2018 4.30  3.40 - 4.80 g/dL Final   • ALT (SGPT) 08/28/2018 13  10 - 36 U/L Final   • AST (SGOT) 08/28/2018 30  10 - 30 U/L Final   • Alkaline Phosphatase 08/28/2018 88  35 - 104 U/L Final   • Total Bilirubin 08/28/2018 0.2  0.2 - 1.8 mg/dL Final   • eGFR Non African Amer 08/28/2018 40* >60 mL/min/1.73 Final   • Globulin 08/28/2018 3.4  gm/dL Final   • A/G Ratio 08/28/2018 1.3* 1.5 - 2.5 g/dL Final   • BUN/Creatinine Ratio 08/28/2018 15.0  7.0 - 25.0 Final   • Anion Gap 08/28/2018 3.7  3.6 - 11.2 mmol/L Final   • " Protime 08/28/2018 14.9  11.0 - 15.4 Seconds Final   • INR 08/28/2018 1.14* 0.90 - 1.10 Final   • WBC 08/28/2018 7.23  4.50 - 12.50 10*3/mm3 Final   • RBC 08/28/2018 3.52* 4.20 - 5.40 10*6/mm3 Final   • Hemoglobin 08/28/2018 10.5* 12.0 - 16.0 g/dL Final   • Hematocrit 08/28/2018 32.7* 37.0 - 47.0 % Final   • MCV 08/28/2018 92.9  80.0 - 94.0 fL Final   • MCH 08/28/2018 29.8  27.0 - 33.0 pg Final   • MCHC 08/28/2018 32.1* 33.0 - 37.0 g/dL Final   • RDW 08/28/2018 16.0* 11.5 - 14.5 % Final   • RDW-SD 08/28/2018 52.4  37.0 - 54.0 fl Final   • MPV 08/28/2018 10.1* 6.0 - 10.0 fL Final   • Platelets 08/28/2018 231  130 - 400 10*3/mm3 Final   • Neutrophil % 08/28/2018 38.9* 40.0 - 75.0 % Final   • Lymphocyte % 08/28/2018 38.2  16.0 - 46.0 % Final   • Monocyte % 08/28/2018 19.4* 0.0 - 12.0 % Final   • Eosinophil % 08/28/2018 2.2  0.0 - 7.0 % Final   • Basophil % 08/28/2018 1.0  0.0 - 2.0 % Final   • Immature Grans % 08/28/2018 0.3  0.0 - 0.5 % Final   • Neutrophils, Absolute 08/28/2018 2.82  1.40 - 6.50 10*3/mm3 Final   • Lymphocytes, Absolute 08/28/2018 2.76  1.00 - 3.00 10*3/mm3 Final   • Monocytes, Absolute 08/28/2018 1.40* 0.10 - 0.90 10*3/mm3 Final   • Eosinophils, Absolute 08/28/2018 0.16  0.00 - 0.70 10*3/mm3 Final   • Basophils, Absolute 08/28/2018 0.07  0.00 - 0.30 10*3/mm3 Final   • Immature Grans, Absolute 08/28/2018 0.02  0.00 - 0.03 10*3/mm3 Final   • Osmolality Calc 08/28/2018 280.0  273.0 - 305.0 mOsm/kg Final       Physical Exam   Constitutional: She is oriented to person, place, and time. She appears well-developed and well-nourished.   HENT:   Head: Normocephalic and atraumatic.   Nose: Nose normal.   Mouth/Throat: Oropharynx is clear and moist.   Eyes: Conjunctivae and EOM are normal. Pupils are equal, round, and reactive to light.   Neck: Normal range of motion. Neck supple. No tracheal deviation present. No thyromegaly present.   Cardiovascular: Normal rate, regular rhythm, normal heart sounds and intact  distal pulses.   No murmur heard.  Pulmonary/Chest: Effort normal. No respiratory distress. She has wheezes (chronic).   Abdominal: Soft. Bowel sounds are normal. There is no tenderness. There is no guarding.   Musculoskeletal: Normal range of motion. She exhibits tenderness (right lower thoracic back with muscle spasm noted on exam). She exhibits no edema.   Lymphadenopathy:     She has no cervical adenopathy.   Neurological: She is alert and oriented to person, place, and time.   Skin: Skin is warm and dry. No rash noted.   Psychiatric: She has a normal mood and affect. Her behavior is normal.   Nursing note and vitals reviewed.      Assessment/Plan     Diagnoses and all orders for this visit:    Muscle spasm  -     cyclobenzaprine (FLEXERIL) 10 MG tablet; Take 1 tablet by mouth 3 (Three) Times a Day As Needed for Muscle Spasms.    Mixed hyperlipidemia  -     atorvastatin (LIPITOR) 40 MG tablet; Take 1 tablet by mouth Daily.  -     fenofibrate micronized (LOFIBRA) 200 MG capsule; Take 1 capsule by mouth Every Morning Before Breakfast.    Coronary artery disease of native artery of native heart with stable angina pectoris (CMS/HCC)  -     clopidogrel (PLAVIX) 75 MG tablet; Take 1 tablet by mouth Daily.    Gastroesophageal reflux disease with esophagitis  -     esomeprazole (nexIUM) 40 MG capsule; Take 1 capsule by mouth Every Morning Before Breakfast.    Acquired hypothyroidism  -     levothyroxine (SYNTHROID, LEVOTHROID) 25 MCG tablet; Take 1 tablet by mouth Daily.    RLS (restless legs syndrome)  -     rOPINIRole (REQUIP) 0.25 MG tablet; Take 4 tablets by mouth every night at bedtime. Take 1 hour before bedtime.    Chronic bronchitis, unspecified chronic bronchitis type (CMS/HCC)  Comments:  Smoking cessation was strongly advised today.  Patient declines assistance with smoking cessation.  Orders:  -     Umeclidinium Bromide (INCRUSE ELLIPTA) 62.5 MCG/INH aerosol powder ; Inhale 1 puff Daily.    Nonintractable  epilepsy without status epilepticus, unspecified epilepsy type (CMS/HCC)  -     Ambulatory Referral to Neurology    Pain of right lower extremity  -     ketorolac (TORADOL) injection 60 mg    Need for immunization against influenza  -     Flucelvax Quad=>4Years (PFS); Standing  -     Flucelvax Quad=>4Years (PFS)    COPD mixed type (CMS/HCC)  -     XR Chest PA & Lateral; Future                 This document has been electronically signed by:  Birdie Shipley PA-C

## 2019-11-26 NOTE — TELEPHONE ENCOUNTER
In chart      - Message from CONNOR Gonzales sent at 11/26/2019 11:34 AM EST -----  Please get me her NCS/EMG results done here within the last month

## 2019-12-09 ENCOUNTER — TELEPHONE (OUTPATIENT)
Dept: FAMILY MEDICINE CLINIC | Facility: CLINIC | Age: 69
End: 2019-12-09

## 2019-12-24 RX ORDER — METOPROLOL SUCCINATE 25 MG/1
25 TABLET, EXTENDED RELEASE ORAL DAILY
Qty: 30 TABLET | Refills: 2 | Status: SHIPPED | OUTPATIENT
Start: 2019-12-24 | End: 2020-03-03

## 2020-01-13 DIAGNOSIS — G40.309 NONINTRACTABLE GENERALIZED IDIOPATHIC EPILEPSY WITHOUT STATUS EPILEPTICUS (HCC): ICD-10-CM

## 2020-01-14 RX ORDER — POTASSIUM CHLORIDE 750 MG/1
10 TABLET, FILM COATED, EXTENDED RELEASE ORAL DAILY
Qty: 90 TABLET | Refills: 0 | Status: SHIPPED | OUTPATIENT
Start: 2020-01-14 | End: 2020-04-06

## 2020-01-14 RX ORDER — PHENYTOIN SODIUM 100 MG/1
CAPSULE, EXTENDED RELEASE ORAL
Qty: 450 CAPSULE | Refills: 0 | Status: SHIPPED | OUTPATIENT
Start: 2020-01-14 | End: 2020-04-06

## 2020-03-03 RX ORDER — METOPROLOL SUCCINATE 25 MG/1
25 TABLET, EXTENDED RELEASE ORAL DAILY
Qty: 30 TABLET | Refills: 2 | Status: SHIPPED | OUTPATIENT
Start: 2020-03-03 | End: 2020-04-30

## 2020-04-05 DIAGNOSIS — G40.309 NONINTRACTABLE GENERALIZED IDIOPATHIC EPILEPSY WITHOUT STATUS EPILEPTICUS (HCC): ICD-10-CM

## 2020-04-06 RX ORDER — POTASSIUM CHLORIDE 750 MG/1
10 TABLET, FILM COATED, EXTENDED RELEASE ORAL DAILY
Qty: 90 TABLET | Refills: 0 | Status: SHIPPED | OUTPATIENT
Start: 2020-04-06 | End: 2020-08-10

## 2020-04-06 RX ORDER — PHENYTOIN SODIUM 100 MG/1
CAPSULE, EXTENDED RELEASE ORAL
Qty: 450 CAPSULE | Refills: 0 | Status: SHIPPED | OUTPATIENT
Start: 2020-04-06 | End: 2020-08-10

## 2020-04-30 DIAGNOSIS — F33.2 SEVERE EPISODE OF RECURRENT MAJOR DEPRESSIVE DISORDER, WITHOUT PSYCHOTIC FEATURES (HCC): ICD-10-CM

## 2020-04-30 DIAGNOSIS — F41.1 GAD (GENERALIZED ANXIETY DISORDER): ICD-10-CM

## 2020-04-30 RX ORDER — METOPROLOL SUCCINATE 25 MG/1
TABLET, EXTENDED RELEASE ORAL
Qty: 90 TABLET | Refills: 3 | Status: SHIPPED | OUTPATIENT
Start: 2020-04-30 | End: 2020-09-25 | Stop reason: SDUPTHER

## 2020-04-30 RX ORDER — MONTELUKAST SODIUM 10 MG/1
10 TABLET ORAL NIGHTLY
Qty: 90 TABLET | Refills: 5 | Status: SHIPPED | OUTPATIENT
Start: 2020-04-30

## 2020-04-30 RX ORDER — CITALOPRAM 40 MG/1
40 TABLET ORAL DAILY
Qty: 30 TABLET | Refills: 5 | Status: SHIPPED | OUTPATIENT
Start: 2020-04-30 | End: 2020-09-25 | Stop reason: SDUPTHER

## 2020-06-02 ENCOUNTER — TELEPHONE (OUTPATIENT)
Dept: FAMILY MEDICINE CLINIC | Facility: CLINIC | Age: 70
End: 2020-06-02

## 2020-06-02 NOTE — TELEPHONE ENCOUNTER
Unable to reach, numbers out of service   Mailed letter today for pt to contact the office tm    ----- Message from Vilma Flowers MA sent at 6/2/2020 12:22 PM EDT -----  Can you call her and make her an appt?

## 2020-08-08 DIAGNOSIS — G40.309 NONINTRACTABLE GENERALIZED IDIOPATHIC EPILEPSY WITHOUT STATUS EPILEPTICUS (HCC): ICD-10-CM

## 2020-08-10 RX ORDER — POTASSIUM CHLORIDE 750 MG/1
10 TABLET, FILM COATED, EXTENDED RELEASE ORAL DAILY
Qty: 90 TABLET | Refills: 0 | Status: SHIPPED | OUTPATIENT
Start: 2020-08-10 | End: 2020-09-25 | Stop reason: SDUPTHER

## 2020-08-10 RX ORDER — PHENYTOIN SODIUM 100 MG/1
CAPSULE, EXTENDED RELEASE ORAL
Qty: 450 CAPSULE | Refills: 0 | Status: SHIPPED | OUTPATIENT
Start: 2020-08-10 | End: 2020-09-25 | Stop reason: SDUPTHER

## 2020-09-25 ENCOUNTER — OFFICE VISIT (OUTPATIENT)
Dept: FAMILY MEDICINE CLINIC | Facility: CLINIC | Age: 70
End: 2020-09-25

## 2020-09-25 DIAGNOSIS — E03.9 ACQUIRED HYPOTHYROIDISM: ICD-10-CM

## 2020-09-25 DIAGNOSIS — I50.42 CHRONIC COMBINED SYSTOLIC AND DIASTOLIC CONGESTIVE HEART FAILURE (HCC): ICD-10-CM

## 2020-09-25 DIAGNOSIS — G89.29 CHRONIC BILATERAL LOW BACK PAIN WITH RIGHT-SIDED SCIATICA: ICD-10-CM

## 2020-09-25 DIAGNOSIS — Z11.59 ENCOUNTER FOR HEPATITIS C SCREENING TEST FOR LOW RISK PATIENT: ICD-10-CM

## 2020-09-25 DIAGNOSIS — J96.11 CHRONIC RESPIRATORY FAILURE WITH HYPOXIA (HCC): ICD-10-CM

## 2020-09-25 DIAGNOSIS — J41.8 MIXED SIMPLE AND MUCOPURULENT CHRONIC BRONCHITIS (HCC): ICD-10-CM

## 2020-09-25 DIAGNOSIS — Z12.31 ENCOUNTER FOR SCREENING MAMMOGRAM FOR MALIGNANT NEOPLASM OF BREAST: ICD-10-CM

## 2020-09-25 DIAGNOSIS — F33.2 SEVERE EPISODE OF RECURRENT MAJOR DEPRESSIVE DISORDER, WITHOUT PSYCHOTIC FEATURES (HCC): ICD-10-CM

## 2020-09-25 DIAGNOSIS — M15.9 PRIMARY OSTEOARTHRITIS INVOLVING MULTIPLE JOINTS: ICD-10-CM

## 2020-09-25 DIAGNOSIS — Z00.00 MEDICARE ANNUAL WELLNESS VISIT, SUBSEQUENT: Primary | ICD-10-CM

## 2020-09-25 DIAGNOSIS — G40.309 NONINTRACTABLE GENERALIZED IDIOPATHIC EPILEPSY WITHOUT STATUS EPILEPTICUS (HCC): ICD-10-CM

## 2020-09-25 DIAGNOSIS — M54.41 CHRONIC BILATERAL LOW BACK PAIN WITH RIGHT-SIDED SCIATICA: ICD-10-CM

## 2020-09-25 DIAGNOSIS — E78.2 MIXED HYPERLIPIDEMIA: ICD-10-CM

## 2020-09-25 DIAGNOSIS — F33.41 RECURRENT MAJOR DEPRESSIVE DISORDER, IN PARTIAL REMISSION (HCC): ICD-10-CM

## 2020-09-25 DIAGNOSIS — Z95.810 PRESENCE OF AUTOMATIC (IMPLANTABLE) CARDIAC DEFIBRILLATOR: ICD-10-CM

## 2020-09-25 DIAGNOSIS — G43.109 MIGRAINE WITH AURA AND WITHOUT STATUS MIGRAINOSUS, NOT INTRACTABLE: ICD-10-CM

## 2020-09-25 DIAGNOSIS — F17.200 TOBACCO USE DISORDER: ICD-10-CM

## 2020-09-25 DIAGNOSIS — K21.00 GASTROESOPHAGEAL REFLUX DISEASE WITH ESOPHAGITIS: ICD-10-CM

## 2020-09-25 DIAGNOSIS — F41.1 GAD (GENERALIZED ANXIETY DISORDER): ICD-10-CM

## 2020-09-25 DIAGNOSIS — I25.118 CORONARY ARTERY DISEASE OF NATIVE ARTERY OF NATIVE HEART WITH STABLE ANGINA PECTORIS (HCC): ICD-10-CM

## 2020-09-25 DIAGNOSIS — M81.0 OSTEOPOROSIS, SENILE: ICD-10-CM

## 2020-09-25 DIAGNOSIS — H91.93 DECREASED HEARING OF BOTH EARS: ICD-10-CM

## 2020-09-25 DIAGNOSIS — J30.89 CHRONIC NONSEASONAL ALLERGIC RHINITIS DUE TO POLLEN: ICD-10-CM

## 2020-09-25 DIAGNOSIS — I10 ESSENTIAL HYPERTENSION: ICD-10-CM

## 2020-09-25 PROBLEM — J41.0 SIMPLE CHRONIC BRONCHITIS (HCC): Status: RESOLVED | Noted: 2018-06-19 | Resolved: 2020-09-25

## 2020-09-25 PROBLEM — J44.9 CHRONIC OBSTRUCTIVE PULMONARY DISEASE, UNSPECIFIED (HCC): Status: ACTIVE | Noted: 2020-09-25

## 2020-09-25 PROBLEM — J44.1 COPD EXACERBATION (HCC): Status: ACTIVE | Noted: 2017-03-07

## 2020-09-25 PROBLEM — J44.1 COPD EXACERBATION (HCC): Status: RESOLVED | Noted: 2017-03-07 | Resolved: 2020-09-25

## 2020-09-25 PROBLEM — R09.02 HYPOXIA: Status: RESOLVED | Noted: 2018-06-19 | Resolved: 2020-09-25

## 2020-09-25 PROCEDURE — 90686 IIV4 VACC NO PRSV 0.5 ML IM: CPT | Performed by: PHYSICIAN ASSISTANT

## 2020-09-25 PROCEDURE — G0009 ADMIN PNEUMOCOCCAL VACCINE: HCPCS | Performed by: PHYSICIAN ASSISTANT

## 2020-09-25 PROCEDURE — G0008 ADMIN INFLUENZA VIRUS VAC: HCPCS | Performed by: PHYSICIAN ASSISTANT

## 2020-09-25 PROCEDURE — 90732 PPSV23 VACC 2 YRS+ SUBQ/IM: CPT | Performed by: PHYSICIAN ASSISTANT

## 2020-09-25 PROCEDURE — G0439 PPPS, SUBSEQ VISIT: HCPCS | Performed by: PHYSICIAN ASSISTANT

## 2020-09-25 PROCEDURE — 96372 THER/PROPH/DIAG INJ SC/IM: CPT | Performed by: PHYSICIAN ASSISTANT

## 2020-09-25 PROCEDURE — 96160 PT-FOCUSED HLTH RISK ASSMT: CPT | Performed by: PHYSICIAN ASSISTANT

## 2020-09-25 RX ORDER — FUROSEMIDE 40 MG/1
40 TABLET ORAL DAILY
Qty: 90 TABLET | Refills: 3 | Status: SHIPPED | OUTPATIENT
Start: 2020-09-25

## 2020-09-25 RX ORDER — PHENYTOIN SODIUM 100 MG/1
100 CAPSULE, EXTENDED RELEASE ORAL SEE ADMIN INSTRUCTIONS
Qty: 450 CAPSULE | Refills: 5 | Status: SHIPPED | OUTPATIENT
Start: 2020-09-25 | End: 2021-01-12

## 2020-09-25 RX ORDER — IPRATROPIUM BROMIDE AND ALBUTEROL SULFATE 2.5; .5 MG/3ML; MG/3ML
3 SOLUTION RESPIRATORY (INHALATION)
Qty: 360 ML | Refills: 5 | Status: SHIPPED | OUTPATIENT
Start: 2020-09-25

## 2020-09-25 RX ORDER — POTASSIUM CHLORIDE 750 MG/1
10 TABLET, FILM COATED, EXTENDED RELEASE ORAL DAILY
Qty: 90 TABLET | Refills: 3 | Status: SHIPPED | OUTPATIENT
Start: 2020-09-25

## 2020-09-25 RX ORDER — ISOSORBIDE MONONITRATE 30 MG/1
TABLET, EXTENDED RELEASE ORAL
COMMUNITY
Start: 2020-08-21

## 2020-09-25 RX ORDER — LISINOPRIL 2.5 MG/1
2.5 TABLET ORAL DAILY
COMMUNITY
Start: 2020-08-21

## 2020-09-25 RX ORDER — METOPROLOL SUCCINATE 25 MG/1
25 TABLET, EXTENDED RELEASE ORAL DAILY
Qty: 90 TABLET | Refills: 3 | Status: SHIPPED | OUTPATIENT
Start: 2020-09-25

## 2020-09-25 RX ORDER — METHYLPREDNISOLONE ACETATE 80 MG/ML
80 INJECTION, SUSPENSION INTRA-ARTICULAR; INTRALESIONAL; INTRAMUSCULAR; SOFT TISSUE ONCE
Status: COMPLETED | OUTPATIENT
Start: 2020-09-25 | End: 2020-09-25

## 2020-09-25 RX ORDER — CITALOPRAM 40 MG/1
40 TABLET ORAL DAILY
Qty: 30 TABLET | Refills: 5 | Status: SHIPPED | OUTPATIENT
Start: 2020-09-25

## 2020-09-25 RX ADMIN — METHYLPREDNISOLONE ACETATE 80 MG: 80 INJECTION, SUSPENSION INTRA-ARTICULAR; INTRALESIONAL; INTRAMUSCULAR; SOFT TISSUE at 12:28

## 2020-09-25 NOTE — PATIENT INSTRUCTIONS
Medicare Wellness  Personal Prevention Plan of Service     Date of Office Visit:  2020  Encounter Provider:  CONNOR Gonzales  Place of Service:  [facility]  Patient Name: Michaela Hutchison  :  1950    As part of the Medicare Wellness portion of your visit today, we are providing you with this personalized preventive plan of services (PPPS). This plan is based upon recommendations of the [place] Preventive Services Task Force (USPSTF) and the Advisory Committee on Immunization Practices (ACIP).    This lists the preventive care services that should be considered, and provides dates of when you are due. Items listed as completed are up-to-date and do not require any further intervention.    Health Maintenance   Topic Date Due   • TDAP/TD VACCINES (1 - Tdap) 1970   • ZOSTER VACCINE (1 of 2) 2001   • HEPATITIS C SCREENING  2018   • LIPID PANEL  2018   • DXA SCAN  2018   • MEDICARE ANNUAL WELLNESS  2021   • MAMMOGRAM  2022   • COLONOSCOPY  2025   • INFLUENZA VACCINE  Completed       Orders Placed This Encounter   Procedures   • DEXA Bone Density Axial     Standing Status:   Future     Standing Expiration Date:   2021   • Mammo Screening Digital Tomosynthesis Bilateral With CAD     Standing Status:   Future     Standing Expiration Date:   2021     Order Specific Question:   Reason for Exam:     Answer:   breast cancer screen   • XR Chest PA & Lateral     Standing Status:   Future     Standing Expiration Date:   2021     Order Specific Question:   Reason for Exam:     Answer:   shortness of breath   • Fluarix Quad >6 Months ([id number])   • Pneumococcal Polysaccharide Vaccine 23-Valent Greater Than or Equal To 1yo Subcutaneous / IM   • Comprehensive Metabolic Panel     Standing Status:   Future     Standing Expiration Date:   2021   • TSH     Standing Status:   Future     Standing Expiration Date:    9/25/2021   • Vitamin D 25 Hydroxy     Standing Status:   Future     Standing Expiration Date:   9/25/2021   • Lipid Panel     Standing Status:   Future     Standing Expiration Date:   9/25/2021   • T4, Free     Standing Status:   Future     Standing Expiration Date:   9/25/2021   • Phenytoin level, free     Standing Status:   Future     Standing Expiration Date:   9/25/2021   • Phenytoin level, total     Standing Status:   Future     Standing Expiration Date:   9/25/2021   • Hepatitis C antibody     Standing Status:   Future     Standing Expiration Date:   9/25/2021   • CBC & Differential     Standing Status:   Future     Standing Expiration Date:   9/25/2021     Order Specific Question:   Manual Differential     Answer:   No       No follow-ups on file.          Advance Directive    Advance directives are legal documents that let you make choices ahead of time about your health care and medical treatment in case you become unable to communicate for yourself. Advance directives are a way for you to communicate your wishes to family, friends, and health care providers. This can help convey your decisions about end-of-life care if you become unable to communicate.  Discussing and writing advance directives should happen over time rather than all at once. Advance directives can be changed depending on your situation and what you want, even after you have signed the advance directives.  If you do not have an advance directive, some states assign family decision makers to act on your behalf based on how closely you are related to them. Each state has its own laws regarding advance directives. You may want to check with your health care provider, , or state representative about the laws in your state. There are different types of advance directives, such as:  · Medical power of .  · Living will.  · Do not resuscitate (DNR) or do not attempt resuscitation (DNAR) order.  Health care proxy and medical power  of   A health care proxy, also called a health care agent, is a person who is appointed to make medical decisions for you in cases in which you are unable to make the decisions yourself. Generally, people choose someone they know well and trust to represent their preferences. Make sure to ask this person for an agreement to act as your proxy. A proxy may have to exercise judgment in the event of a medical decision for which your wishes are not known.  A medical power of  is a legal document that names your health care proxy. Depending on the laws in your state, after the document is written, it may also need to be:  · Signed.  · Notarized.  · Dated.  · Copied.  · Witnessed.  · Incorporated into your medical record.  You may also want to appoint someone to manage your financial affairs in a situation in which you are unable to do so. This is called a durable power of  for finances. It is a separate legal document from the durable power of  for health care. You may choose the same person or someone different from your health care proxy to act as your agent in financial matters.  If you do not appoint a proxy, or if there is a concern that the proxy is not acting in your best interests, a court-appointed guardian may be designated to act on your behalf.  Living will  A living will is a set of instructions documenting your wishes about medical care when you cannot express them yourself. Health care providers should keep a copy of your living will in your medical record. You may want to give a copy to family members or friends. To alert caregivers in case of an emergency, you can place a card in your wallet to let them know that you have a living will and where they can find it. A living will is used if you become:  · Terminally ill.  · Incapacitated.  · Unable to communicate or make decisions.  Items to consider in your living will include:  · The use or non-use of life-sustaining  equipment, such as dialysis machines and breathing machines (ventilators).  · A DNR or DNAR order, which is the instruction not to use cardiopulmonary resuscitation (CPR) if breathing or heartbeat stops.  · The use or non-use of tube feeding.  · Withholding of food and fluids.  · Comfort (palliative) care when the goal becomes comfort rather than a cure.  · Organ and tissue donation.  A living will does not give instructions for distributing your money and property if you should pass away. It is recommended that you seek the advice of a  when writing a will. Decisions about taxes, beneficiaries, and asset distribution will be legally binding. This process can relieve your family and friends of any concerns surrounding disputes or questions that may come up about the distribution of your assets.  DNR or DNAR  A DNR or DNAR order is a request not to have CPR in the event that your heart stops beating or you stop breathing. If a DNR or DNAR order has not been made and shared, a health care provider will try to help any patient whose heart has stopped or who has stopped breathing. If you plan to have surgery, talk with your health care provider about how your DNR or DNAR order will be followed if problems occur.  Summary  · Advance directives are the legal documents that allow you to make choices ahead of time about your health care and medical treatment in case you become unable to communicate for yourself.  · The process of discussing and writing advance directives should happen over time. You can change the advance directives, even after you have signed them.  · Advance directives include DNR or DNAR orders, living rico, and designating an agent as your medical power of .  This information is not intended to replace advice given to you by your health care provider. Make sure you discuss any questions you have with your health care provider.  Document Released: 03/26/2009 Document Revised: 01/22/2020  Document Reviewed: 11/06/2017  [employer] Patient Education © 2020 [employer]      Smoking Tobacco Information, Adult  Smoking tobacco can be harmful to your health. Tobacco contains a poisonous (toxic), colorless chemical called nicotine. Nicotine is addictive. It changes the brain and can make it hard to stop smoking. Tobacco also has other toxic chemicals that can hurt your body and raise your risk of many cancers.  How can smoking tobacco affect me?  Smoking tobacco puts you at risk for:  · Cancer. Smoking is most commonly associated with lung cancer, but can also lead to cancer in other parts of the body.  · Chronic obstructive pulmonary disease (COPD). This is a long-term lung condition that makes it hard to breathe. It also gets worse over time.  · High blood pressure (hypertension), heart disease, stroke, or heart attack.  · Lung infections, such as pneumonia.  · Cataracts. This is when the lenses in the eyes become clouded.  · Digestive problems. This may include peptic ulcers, heartburn, and gastroesophageal reflux disease (GERD).  · Oral health problems, such as gum disease and tooth loss.  · Loss of taste and smell.  Smoking can affect your appearance by causing:  · Wrinkles.  · Yellow or stained teeth, fingers, and fingernails.  Smoking tobacco can also affect your social life, because:  · It may be challenging to find places to smoke when away from home. Many workplaces, restaurants, hotels, and public places are tobacco-free.  · Smoking is expensive. This is due to the cost of tobacco and the long-term costs of treating health problems from smoking.  · Secondhand smoke may affect those around you. Secondhand smoke can cause lung cancer, breathing problems, and heart disease. Children of smokers have a higher risk for:  ? Sudden infant death syndrome (SIDS).  ? Ear infections.  ? Lung infections.  If you currently smoke tobacco, quitting now can help you:  · Lead a longer and healthier life.  · Look,  smell, breathe, and feel better over time.  · Save money.  · Protect others from the harms of secondhand smoke.  What actions can I take to prevent health problems?  Quit smoking    · Do not start smoking. Quit if you already do.  · Make a plan to quit smoking and commit to it. Look for programs to help you and ask your health care provider for recommendations and ideas.  · Set a date and write down all the reasons you want to quit.  · Let your friends and family know you are quitting so they can help and support you. Consider finding friends who also want to quit. It can be easier to quit with someone else, so that you can support each other.  · Talk with your health care provider about using nicotine replacement medicines to help you quit, such as gum, lozenges, patches, sprays, or pills.  · Do not replace cigarette smoking with electronic cigarettes, which are commonly called e-cigarettes. The safety of e-cigarettes is not known, and some may contain harmful chemicals.  · If you try to quit but return to smoking, stay positive. It is common to slip up when you first quit, so take it one day at a time.  · Be prepared for cravings. When you feel the urge to smoke, chew gum or suck on hard candy.  Lifestyle  · Stay busy and take care of your body.  · Drink enough fluid to keep your urine pale yellow.  · Get plenty of exercise and eat a healthy diet. This can help prevent weight gain after quitting.  · Monitor your eating habits. Quitting smoking can cause you to have a larger appetite than when you smoke.  · Find ways to relax. Go out with friends or family to a movie or a restaurant where people do not smoke.  · Ask your health care provider about having regular tests (screenings) to check for cancer. This may include blood tests, imaging tests, and other tests.  · Find ways to manage your stress, such as meditation, yoga, or exercise.  Where to find support  To get support to quit smoking, consider:  · Asking your  health care provider for more information and resources.  · Taking classes to learn more about quitting smoking.  · Looking for local organizations that offer resources about quitting smoking.  · Joining a support group for people who want to quit smoking in your local community.  · Calling the smokefree.gov counselor helpline: 1-800-Quit-Now (1-317.259.1244)  Where to find more information  You may find more information about quitting smoking from:  · HelpGuide.org: www.helpguide.org  · Smokefree.gov: smokefree.gov  · American Lung Association: www.lung.org  Contact a health care provider if you:  · Have problems breathing.  · Notice that your lips, nose, or fingers turn blue.  · Have chest pain.  · Are coughing up blood.  · Feel faint or you pass out.  · Have other health changes that cause you to worry.  Summary  · Smoking tobacco can negatively affect your health, the health of those around you, your finances, and your social life.  · Do not start smoking. Quit if you already do. If you need help quitting, ask your health care provider.  · Think about joining a support group for people who want to quit smoking in your local community. There are many effective programs that will help you to quit this behavior.  This information is not intended to replace advice given to you by your health care provider. Make sure you discuss any questions you have with your health care provider.  Document Released: 01/02/2018 Document Revised: 02/06/2019 Document Reviewed: 01/02/2018  Elsevier Patient Education © 2020 [employer].      Fall Prevention in the Home, Adult  Falls can cause injuries. They can happen to people of all ages. There are many things you can do to make your home safe and to help prevent falls. Ask for help when making these changes, if needed.  What actions can I take to prevent falls?  General Instructions  · Use good lighting in all rooms. Replace any light bulbs that burn out.  · Turn on the lights when  you go into a dark area. Use night-lights.  · Keep items that you use often in easy-to-reach places. Lower the shelves around your home if necessary.  · Set up your furniture so you have a clear path. Avoid moving your furniture around.  · Do not have throw rugs and other things on the floor that can make you trip.  · Avoid walking on wet floors.  · If any of your floors are uneven, fix them.  · Add color or contrast paint or tape to clearly shaheed and help you see:  ? Any grab bars or handrails.  ? First and last steps of stairways.  ? Where the edge of each step is.  · If you use a stepladder:  ? Make sure that it is fully opened. Do not climb a closed stepladder.  ? Make sure that both sides of the stepladder are locked into place.  ? Ask someone to hold the stepladder for you while you use it.  · If there are any pets around you, be aware of where they are.  What can I do in the bathroom?         · Keep the floor dry. Clean up any water that spills onto the floor as soon as it happens.  · Remove soap buildup in the tub or shower regularly.  · Use non-skid mats or decals on the floor of the tub or shower.  · Attach bath mats securely with double-sided, non-slip rug tape.  · If you need to sit down in the shower, use a plastic, non-slip stool.  · Install grab bars by the toilet and in the tub and shower. Do not use towel bars as grab bars.  What can I do in the bedroom?  · Make sure that you have a light by your bed that is easy to reach.  · Do not use any sheets or blankets that are too big for your bed. They should not hang down onto the floor.  · Have a firm chair that has side arms. You can use this for support while you get dressed.  What can I do in the kitchen?  · Clean up any spills right away.  · If you need to reach something above you, use a strong step stool that has a grab bar.  · Keep electrical cords out of the way.  · Do not use floor polish or wax that makes floors slippery. If you must use wax, use  non-skid floor wax.  What can I do with my stairs?  · Do not leave any items on the stairs.  · Make sure that you have a light switch at the top of the stairs and the bottom of the stairs. If you do not have them, ask someone to add them for you.  · Make sure that there are handrails on both sides of the stairs, and use them. Fix handrails that are broken or loose. Make sure that handrails are as long as the stairways.  · Install non-slip stair treads on all stairs in your home.  · Avoid having throw rugs at the top or bottom of the stairs. If you do have throw rugs, attach them to the floor with carpet tape.  · Choose a carpet that does not hide the edge of the steps on the stairway.  · Check any carpeting to make sure that it is firmly attached to the stairs. Fix any carpet that is loose or worn.  What can I do on the outside of my home?  · Use bright outdoor lighting.  · Regularly fix the edges of walkways and driveways and fix any cracks.  · Remove anything that might make you trip as you walk through a door, such as a raised step or threshold.  · Trim any bushes or trees on the path to your home.  · Regularly check to see if handrails are loose or broken. Make sure that both sides of any steps have handrails.  · Install guardrails along the edges of any raised decks and porches.  · Clear walking paths of anything that might make someone trip, such as tools or rocks.  · Have any leaves, snow, or ice cleared regularly.  · Use sand or salt on walking paths during winter.  · Clean up any spills in your garage right away. This includes grease or oil spills.  What other actions can I take?  · Wear shoes that:  ? Have a low heel. Do not wear high heels.  ? Have rubber bottoms.  ? Are comfortable and fit you well.  ? Are closed at the toe. Do not wear open-toe sandals.  · Use tools that help you move around (mobility aids) if they are needed. These include:  ? Canes.  ? Walkers.  ? Scooters.  ? Crutches.  · Review your  medicines with your doctor. Some medicines can make you feel dizzy. This can increase your chance of falling.  Ask your doctor what other things you can do to help prevent falls.  Where to find more information  · Centers for Disease Control and Prevention, STEADI: https://cdc.gov  · National [place] on Aging: https://ea4dwpq.[place].nih.gov  Contact a doctor if:  · You are afraid of falling at home.  · You feel weak, drowsy, or dizzy at home.  · You fall at home.  Summary  · There are many simple things that you can do to make your home safe and to help prevent falls.  · Ways to make your home safe include removing tripping hazards and installing grab bars in the bathroom.  · Ask for help when making these changes in your home.  This information is not intended to replace advice given to you by your health care provider. Make sure you discuss any questions you have with your health care provider.  Document Released: 10/14/2010 Document Revised: 04/09/2020 Document Reviewed: 08/02/2018  [employer] Patient Education © 2020 [employer]    Pneumococcal Conjugate Vaccine (PCV13): What You Need to Know  1. Why get vaccinated?  Pneumococcal conjugate vaccine (PCV13) can prevent pneumococcal disease.  Pneumococcal disease refers to any illness caused by pneumococcal bacteria. These bacteria can cause many types of illnesses, including pneumonia, which is an infection of the lungs. Pneumococcal bacteria are one of the most common causes of pneumonia.  Besides pneumonia, pneumococcal bacteria can also cause:  · Ear infections  · Sinus infections  · Meningitis (infection of the tissue covering the brain and spinal cord)  · Bacteremia (bloodstream infection)  Anyone can get pneumococcal disease, but children under 2 years of age, people with certain medical conditions, adults 65 years or older, and cigarette smokers are at the highest risk.  Most pneumococcal infections are mild. However, some can result in long-term problems,  such as brain damage or hearing loss. Meningitis, bacteremia, and pneumonia caused by pneumococcal disease can be fatal.  2. PCV13  PCV13 protects against 13 types of bacteria that cause pneumococcal disease.  Infants and young children usually need 4 doses of pneumococcal conjugate vaccine, at 2, 4, 6, and 12-15 months of age. In some cases, a child might need fewer than 4 doses to complete PCV13 vaccination.  A dose of PCV23 vaccine is also recommended for anyone 2 years or older with certain medical conditions if they did not already receive PCV13.  This vaccine may be given to adults 65 years or older based on discussions between the patient and health care provider.  3. Talk with your health care provider  Tell your vaccine provider if the person getting the vaccine:  · Has had an allergic reaction after a previous dose of PCV13, to an earlier pneumococcal conjugate vaccine known as PCV7, or to any vaccine containing diphtheria toxoid (for example, DTaP), or has any severe, life-threatening allergies.  · In some cases, your health care provider may decide to postpone PCV13 vaccination to a future visit.  People with minor illnesses, such as a cold, may be vaccinated. People who are moderately or severely ill should usually wait until they recover before getting PCV13.  Your health care provider can give you more information.  4. Risks of a vaccine reaction  · Redness, swelling, pain, or tenderness where the shot is given, and fever, loss of appetite, fussiness (irritability), feeling tired, headache, and chills can happen after PCV13.  Young children may be at increased risk for seizures caused by fever after PCV13 if it is administered at the same time as inactivated influenza vaccine. Ask your health care provider for more information.  People sometimes faint after medical procedures, including vaccination. Tell your provider if you feel dizzy or have vision changes or ringing in the ears.  As with any  medicine, there is a very remote chance of a vaccine causing a severe allergic reaction, other serious injury, or death.  5. What if there is a serious problem?  An allergic reaction could occur after the vaccinated person leaves the clinic. If you see signs of a severe allergic reaction (hives, swelling of the face and throat, difficulty breathing, a fast heartbeat, dizziness, or weakness), call 9-1-1 and get the person to the nearest hospital.  For other signs that concern you, call your health care provider.  Adverse reactions should be reported to the Vaccine Adverse Event Reporting System (VAERS). Your health care provider will usually file this report, or you can do it yourself. Visit the VAERS website at www.vaers.hhs.gov or call 1-658.682.9831. VAERS is only for reporting reactions, and VAERS staff do not give medical advice.  6. The National Vaccine Injury Compensation Program  The National Vaccine Injury Compensation Program (VICP) is a federal program that was created to compensate people who may have been injured by certain vaccines. Visit the VICP website at www.hrsa.gov/vaccinecompensation or call 1-114.852.6748 to learn about the program and about filing a claim. There is a time limit to file a claim for compensation.  7. How can I learn more?  · Ask your health care provider.  · Call your local or state health department.  · Contact the Centers for Disease Control and Prevention (CDC):  ? Call 1-865.646.1008 (9-738-NJF-INFO) or  ? Visit CDC's website at www.cdc.gov/vaccines  Vaccine Information Statement PCV13 Vaccine (10/30/2019)  This information is not intended to replace advice given to you by your health care provider. Make sure you discuss any questions you have with your health care provider.  Document Released: 10/15/2007 Document Revised: 04/07/2020 Document Reviewed: 07/30/2019  Elsevier Patient Education © 2020 [employer] Inc.    Influenza Virus Vaccine injection  What is this  medicine?  INFLUENZA VIRUS VACCINE (in [facility]) helps to reduce the risk of getting influenza also known as the flu. The vaccine only helps protect you against some strains of the flu.  This medicine may be used for other purposes; ask your health care provider or pharmacist if you have questions.  COMMON BRAND NAME(S): Afluria, Afluria Quadrivalent, Agriflu, Alfuria, FLUAD, FLUAD Quadrivalent, Fluarix, Fluarix Quadrivalent, Flublok, Flublok Quadrivalent, FLUCELVAX, Flulaval, Flulaval Quadrivalent, Fluvirin, Fluzone, Fluzone High-Dose, Fluzone Intradermal  What should I tell my health care provider before I take this medicine?  They need to know if you have any of these conditions:  · bleeding disorder like hemophilia  · fever or infection  · Guillain-Blue Eye syndrome or other neurological problems  · immune system problems  · infection with the human immunodeficiency virus (HIV) or AIDS  · low blood platelet counts  · multiple sclerosis  · an unusual or allergic reaction to influenza virus vaccine, latex, other medicines, foods, dyes, or preservatives. Different brands of vaccines contain different allergens. Some may contain latex or eggs. Talk to your doctor about your allergies to make sure that you get the right vaccine.  · pregnant or trying to get pregnant  · breast-feeding  How should I use this medicine?  This vaccine is for injection into a muscle or under the skin. It is given by a health care professional.  A copy of Vaccine Information Statements will be given before each vaccination. Read this sheet carefully each time. The sheet may change frequently.  Talk to your healthcare provider to see which vaccines are right for you. Some vaccines should not be used in all age groups.  Overdosage: If you think you have taken too much of this medicine contact a poison control center or emergency room at once.  NOTE: This medicine is only for you. Do not share this medicine with  others.  What if I miss a dose?  This does not apply.  What may interact with this medicine?  · chemotherapy or radiation therapy  · medicines that lower your immune system like etanercept, anakinra, infliximab, and adalimumab  · medicines that treat or prevent blood clots like warfarin  · phenytoin  · steroid medicines like prednisone or cortisone  · theophylline  · vaccines  This list may not describe all possible interactions. Give your health care provider a list of all the medicines, herbs, non-prescription drugs, or dietary supplements you use. Also tell them if you smoke, drink alcohol, or use illegal drugs. Some items may interact with your medicine.  What should I watch for while using this medicine?  Report any side effects that do not go away within 3 days to your doctor or health care professional. Call your health care provider if any unusual symptoms occur within 6 weeks of receiving this vaccine.  You may still catch the flu, but the illness is not usually as bad. You cannot get the flu from the vaccine. The vaccine will not protect against colds or other illnesses that may cause fever. The vaccine is needed every year.  What side effects may I notice from receiving this medicine?  Side effects that you should report to your doctor or health care professional as soon as possible:  · allergic reactions like skin rash, itching or hives, swelling of the face, lips, or tongue  Side effects that usually do not require medical attention (report to your doctor or health care professional if they continue or are bothersome):  · fever  · headache  · muscle aches and pains  · pain, tenderness, redness, or swelling at the injection site  · tiredness  This list may not describe all possible side effects. Call your doctor for medical advice about side effects. You may report side effects to FDA at 3-264-FDA-8008.  Where should I keep my medicine?  The vaccine will be given by a health care professional in a  clinic, pharmacy, doctor's office, or other health care setting. You will not be given vaccine doses to store at home.  NOTE: This sheet is a summary. It may not cover all possible information. If you have questions about this medicine, talk to your doctor, pharmacist, or health care provider.  © 2020 [employer]/Gold Standard (2019-11-12 08:45:43)

## 2020-09-25 NOTE — PROGRESS NOTES
The ABCs of the Annual Wellness Visit  Subsequent Medicare Wellness Visit    Chief Complaint   Patient presents with   • Medicare Wellness-subsequent       Subjective   History of Present Illness:  Michaela Hutchison is a 70 y.o. female who presents for a Subsequent Medicare Wellness Visit.    COPD-  She complains of chronic shortness of breath and hypoxia.  She does use oxygen at 4 L 24/7.  She continues to smoke 1 pack/day for the last 25 years.  She states that this is a decrease from her previous 2 pack/day smoking history.  She does complain of some mild aching pain in the left lung that began 3 months ago and has progressively worsened.    Tobacco use disorder-  Not at goal.  She continues to smoke 1 pack/day x 25-year history.  She is not interested in help with smoking cessation at this time.    Generalized anxiety disorder-  Controlled with Celexa.  She denies any SI or HI.    Depression-  Stable with Celexa.    Epilepsy-  Controlled with Dilantin.  Patient states her last seizure was approximately 3 years ago.    Hypertension- stable with Lasix, metoprolol, and lisinopril    Chronic congestive heart failure-stable with Lasix and lisinopril    Coronary artery disease-stable with risk factor modification including Lipitor and Plavix    Migraine disorder-  Stable with metoprolol for migraine prophylaxis    Hyperlipidemia-stable with low-cholesterol diet and atorvastatin  Lab Results   Component Value Date    CHOL 117 04/27/2017     Lab Results   Component Value Date    TRIG 137 04/27/2017     Lab Results   Component Value Date    HDL 50 (L) 04/27/2017     Lab Results   Component Value Date    LDL 40 04/27/2017     Allergic rhinitis- stable with Singulair    Chronic respiratory failure-not at goal with oxygen use 24/7.  Patient does have chronic wheezing and shortness of breath.  She states it is not any worse than usual at her visit today.    Gastroesophageal reflux disease-stable with Nexium hypothyroidism-stable  with Synthroid 25 mcg daily  Lab Results   Component Value Date    TSH 0.865 04/27/2017     Osteoarthritis-  She complains of intermittent moderate aching and throbbing joint pain involving her bilateral thumbs and bilateral knees.  She also complains of low back pain due to osteoarthritis with associated sciatica radiating to the right buttock.  Not at goal.  Patient states this is worse with colder weather recently.    Osteoporosis-  Not at goal and patient states that she does fall frequently.    Hearing acuity:  Whisper test  Less than 5 feet left ear  Less than 5 feet right ear    Vision Screening (09/25/2020)  Edited by: Alejandra Feliz MA   Right eye Left eye Both eyes   Without correction 20/200 20/200 20/200     Hearing and vision screening were discussed with the patient today and she will be referred to audiology and patient states she will make her own appointment with ophthalmologist.    HEALTH RISK ASSESSMENT    Recent Hospitalizations:  No hospitalization(s) within the last year.    Current Medical Providers:  Patient Care Team:  Birdie Shipley PA as PCP - General (Family Medicine)    Smoking Status:  Social History     Tobacco Use   Smoking Status Current Every Day Smoker   • Packs/day: 1.00   Smokeless Tobacco Never Used       Alcohol Consumption:  Social History     Substance and Sexual Activity   Alcohol Use No       Depression Screen:   PHQ-2/PHQ-9 Depression Screening 9/25/2020   Little interest or pleasure in doing things 3   Feeling down, depressed, or hopeless 3   Trouble falling or staying asleep, or sleeping too much 3   Feeling tired or having little energy 3   Poor appetite or overeating 3   Feeling bad about yourself - or that you are a failure or have let yourself or your family down 3   Trouble concentrating on things, such as reading the newspaper or watching television 3   Moving or speaking so slowly that other people could have noticed. Or the opposite - being so fidgety or  restless that you have been moving around a lot more than usual 2   Thoughts that you would be better off dead, or of hurting yourself in some way 0   Total Score 23   If you checked off any problems, how difficult have these problems made it for you to do your work, take care of things at home, or get along with other people? Extremely dIfficult       Fall Risk Screen:  ELIDIA Fall Risk Assessment was completed, and patient is at HIGH risk for falls. Assessment completed on:9/25/2020    Health Habits and Functional and Cognitive Screening:  Functional & Cognitive Status 9/25/2020   Do you have difficulty preparing food and eating? Yes   Do you have difficulty bathing yourself, getting dressed or grooming yourself? No   Do you have difficulty using the toilet? No   Do you have difficulty moving around from place to place? Yes   Do you have trouble with steps or getting out of a bed or a chair? Yes   Current Diet Frequent Junk Food   Dental Exam Up to date        Dental Exam Comment patient has dentures   Eye Exam Not up to date   Exercise (times per week) 0 times per week   Current Exercise Activities Include None   Do you need help using the phone?  No   Are you deaf or do you have serious difficulty hearing?  Yes   Do you need help with transportation? Yes   Do you need help shopping? Yes   Do you need help preparing meals?  Yes   Do you need help with housework?  Yes   Do you need help with laundry? Yes   Do you need help taking your medications? Yes   Do you need help managing money? Yes   Do you ever drive or ride in a car without wearing a seat belt? Yes   Have you felt unusual stress, anger or loneliness in the last month? Yes   Who do you live with? Alone   If you need help, do you have trouble finding someone available to you? Yes   Have you been bothered in the last four weeks by sexual problems? No   Do you have difficulty concentrating, remembering or making decisions? Yes         Does the patient have  evidence of cognitive impairment? No    Asprin use counseling:Does not need ASA (and currently is not on it)    Age-appropriate Screening Schedule:  Refer to the list below for future screening recommendations based on patient's age, sex and/or medical conditions. Orders for these recommended tests are listed in the plan section. The patient has been provided with a written plan.    Health Maintenance   Topic Date Due   • TDAP/TD VACCINES (1 - Tdap) 07/27/1970   • ZOSTER VACCINE (1 of 2) 07/27/2001   • LIPID PANEL  06/19/2018   • DXA SCAN  06/19/2018   • MAMMOGRAM  09/25/2022   • COLONOSCOPY  07/16/2025   • INFLUENZA VACCINE  Completed          The following portions of the patient's history were reviewed and updated as appropriate: allergies, current medications, past family history, past medical history, past social history, past surgical history and problem list.    Outpatient Medications Prior to Visit   Medication Sig Dispense Refill   • albuterol (PROVENTIL HFA;VENTOLIN HFA) 108 (90 Base) MCG/ACT inhaler Inhale 2 puffs Every 4 (Four) Hours As Needed for Shortness of Air. 1 inhaler 5   • albuterol (PROVENTIL) (2.5 MG/3ML) 0.083% nebulizer solution Take 2.5 mg by nebulization Every 4 (Four) Hours As Needed for Shortness of Air. 180 vial 5   • atorvastatin (LIPITOR) 40 MG tablet Take 1 tablet by mouth Daily. 90 tablet 3   • clopidogrel (PLAVIX) 75 MG tablet Take 1 tablet by mouth Daily. 90 tablet 3   • esomeprazole (nexIUM) 40 MG capsule Take 1 capsule by mouth Every Morning Before Breakfast. 90 capsule 3   • fenofibrate micronized (LOFIBRA) 200 MG capsule Take 1 capsule by mouth Every Morning Before Breakfast. 90 capsule 3   • isosorbide mononitrate (IMDUR) 30 MG 24 hr tablet TK 1 T PO ONCE D     • levothyroxine (SYNTHROID, LEVOTHROID) 25 MCG tablet Take 1 tablet by mouth Daily. 90 tablet 3   • lisinopril (PRINIVIL,ZESTRIL) 2.5 MG tablet Take 2.5 mg by mouth Daily.     • meclizine (ANTIVERT) 25 MG tablet Take 1  tablet by mouth 3 (Three) Times a Day As Needed for dizziness. 90 tablet 2   • montelukast (SINGULAIR) 10 MG tablet TAKE 1 TABLET BY MOUTH EVERY NIGHT 90 tablet 5   • Umeclidinium Bromide (INCRUSE ELLIPTA) 62.5 MCG/INH aerosol powder  Inhale 1 puff Daily. 90 each 3   • ARIPiprazole (ABILIFY) 2 MG tablet Take 1 tablet by mouth Daily. 30 tablet 0   • citalopram (CeleXA) 40 MG tablet TAKE 1 TABLET BY MOUTH DAILY 30 tablet 5   • cyclobenzaprine (FLEXERIL) 10 MG tablet Take 1 tablet by mouth 3 (Three) Times a Day As Needed for Muscle Spasms. 90 tablet 0   • DILANTIN 100 MG ER capsule TAKE 2 CAPSULES BY MOUTH EVERY MORNING AND 3 CAPSULES BY MOUTH EVERY NIGHT AT BEDTIME 450 capsule 0   • furosemide (LASIX) 40 MG tablet Take 1 tablet by mouth Daily. 90 tablet 3   • ipratropium-albuterol (DUO-NEB) 0.5-2.5 mg/3 ml nebulizer INHALE 1 VIAL PER NEB Q 4 H PRN  5   • metoprolol succinate XL (TOPROL-XL) 25 MG 24 hr tablet TAKE 1 TABLET BY MOUTH ONCE DAILY 90 tablet 3   • potassium chloride (K-DUR) 10 MEQ CR tablet TAKE 1 TABLET BY MOUTH DAILY 90 tablet 0   • gabapentin (NEURONTIN) 800 MG tablet Take 800 mg by mouth.     • isosorbide mononitrate (IMDUR) 60 MG 24 hr tablet Take 1 tablet by mouth Daily. 90 tablet 3   • raNITIdine (ZANTAC) 150 MG tablet TAKE 2 TABLETS BY MOUTH DAILY 180 tablet 0   • rOPINIRole (REQUIP) 1 MG tablet Take 1 tablet by mouth Every Night. Take 1 hour before bedtime. 90 tablet 3     No facility-administered medications prior to visit.        Patient Active Problem List   Diagnosis   • Migraine with aura and without status migrainosus, not intractable   • Tobacco use disorder   • Mixed hyperlipidemia   • BRAEDEN (generalized anxiety disorder)   • Recurrent major depressive disorder, in partial remission (CMS/HCC)   • Coronary artery disease of native artery of native heart with stable angina pectoris (CMS/HCC)   • Chronic combined systolic and diastolic congestive heart failure (CMS/HCC)   • Gastroesophageal  reflux disease with esophagitis   • Essential hypertension   • Chronic bilateral low back pain with sciatica   • Acquired hypothyroidism   • Chronic nonseasonal allergic rhinitis due to pollen   • Nonintractable generalized idiopathic epilepsy without status epilepticus (CMS/HCC)   • Osteoporosis, senile   • Chronic obstructive pulmonary disease, unspecified (CMS/HCC)   • Presence of automatic (implantable) cardiac defibrillator       Advanced Care Planning:  ACP discussion was held with the patient during this visit. Patient does not have an advance directive, information provided.    Review of Systems   Constitutional: Negative for activity change, appetite change, fatigue and fever.   HENT: Negative for ear pain, sinus pressure and sore throat.         Decreased hearing   Eyes: Positive for visual disturbance (failed vision screening). Negative for pain.   Respiratory: Positive for cough (chronic), shortness of breath (chronic) and wheezing (chronic). Negative for chest tightness.    Cardiovascular: Negative for chest pain and palpitations.   Gastrointestinal: Negative for abdominal pain, constipation, diarrhea, nausea and vomiting.   Endocrine: Negative for polydipsia and polyuria.   Genitourinary: Negative for dysuria and frequency.   Musculoskeletal: Positive for arthralgias and back pain. Negative for myalgias.   Skin: Negative for color change and rash.   Allergic/Immunologic: Negative for food allergies and immunocompromised state.   Neurological: Negative for dizziness, syncope and headaches.   Hematological: Negative for adenopathy. Does not bruise/bleed easily.   Psychiatric/Behavioral: Negative for hallucinations and suicidal ideas. The patient is not nervous/anxious.        Compared to one year ago, the patient feels her physical health is worse.  Compared to one year ago, the patient feels her mental health is worse.    Reviewed chart for potential of high risk medication in the elderly: yes  Reviewed  "chart for potential of harmful drug interactions in the elderly:yes    Objective         Vitals:    09/25/20 1117   BP: 136/84   Pulse: 88   Temp: 97.7 °F (36.5 °C)   SpO2: 97%   Weight: 64.2 kg (141 lb 9.6 oz)   Height: 165.1 cm (65\")   PainSc: 0-No pain       Body mass index is 23.56 kg/m².  Discussed the patient's BMI with her. The BMI is in the acceptable range.    Physical Exam  Vitals signs and nursing note reviewed.   Constitutional:       General: She is not in acute distress.     Appearance: Normal appearance. She is well-developed. She is not ill-appearing.   HENT:      Head: Normocephalic and atraumatic.      Right Ear: Tympanic membrane normal.      Left Ear: Tympanic membrane normal.      Nose: Nose normal.      Mouth/Throat:      Mouth: Mucous membranes are moist.      Pharynx: No posterior oropharyngeal erythema.   Eyes:      Extraocular Movements: Extraocular movements intact.      Conjunctiva/sclera: Conjunctivae normal.   Neck:      Musculoskeletal: Normal range of motion and neck supple.      Thyroid: No thyromegaly.      Trachea: No tracheal deviation.   Cardiovascular:      Rate and Rhythm: Normal rate and regular rhythm.      Heart sounds: Normal heart sounds. No murmur.   Pulmonary:      Effort: Pulmonary effort is normal. No respiratory distress.      Breath sounds: Wheezing (generalized throughout lung fields bilaterally) present.   Abdominal:      General: Bowel sounds are normal.      Palpations: Abdomen is soft.      Tenderness: There is no abdominal tenderness. There is no guarding.   Musculoskeletal:         General: Tenderness present. No deformity.      Comments: Coarse crepitus noted in bilateral knee joints.  Tenderness with manipulation of bilateral thumb joints without erythema or swelling noted.   Lymphadenopathy:      Cervical: No cervical adenopathy.   Skin:     General: Skin is warm and dry.      Findings: No rash.   Neurological:      General: No focal deficit present.      " Mental Status: She is alert and oriented to person, place, and time.   Psychiatric:         Mood and Affect: Mood normal.         Behavior: Behavior normal.         Thought Content: Thought content normal.               Assessment/Plan   Medicare Risks and Personalized Health Plan  CMS Preventative Services Quick Reference  Advance Directive Discussion  Breast Cancer/Mammogram Screening  Cardiovascular risk  Depression/Dysphoria  Fall Risk  Hearing Problem  Immunizations Discussed/Encouraged (specific immunizations; Influenza and Pneumococcal 23 )  Osteoprorosis Risk  Polypharmacy  Tobacco Use/Dependance (use dotphrase .tobaccocessation for documentation)    The above risks/problems have been discussed with the patient.  Pertinent information has been shared with the patient in the After Visit Summary.  Follow up plans and orders are seen below in the Assessment/Plan Section.    Diagnoses and all orders for this visit:    1. Medicare annual wellness visit, subsequent (Primary)  Comments:  Performed and documented today    2. BRAEDEN (generalized anxiety disorder)  Comments:  Continue Celexa  Orders:  -     citalopram (CeleXA) 40 MG tablet; Take 1 tablet by mouth Daily.  Dispense: 30 tablet; Refill: 5    3. Severe episode of recurrent major depressive disorder, without psychotic features (CMS/[facility])  Comments:  Continue Celexa  Advised patient to report any new or worsening of psychiatric symptoms.  Orders:  -     citalopram (CeleXA) 40 MG tablet; Take 1 tablet by mouth Daily.  Dispense: 30 tablet; Refill: 5    4. Nonintractable generalized idiopathic epilepsy without status epilepticus (CMS/[facility])  Comments:  Continue Dilantin  Dilantin levels will be ordered today  Orders:  -     Dilantin 100 MG ER capsule; Take 1 capsule by mouth See Admin Instructions. 2 caps qam and 3 caps qhs  Dispense: 450 capsule; Refill: 5  -     Phenytoin level, free; Future  -     Phenytoin level, total; Future    5. Essential  hypertension  Comments:  Continue to monitor blood pressure and pulse daily advised  Continue Lasix, metoprolol, and lisinopril  Orders:  -     furosemide (LASIX) 40 MG tablet; Take 1 tablet by mouth Daily.  Dispense: 90 tablet; Refill: 3    6. Chronic combined systolic and diastolic congestive heart failure (CMS/HCC)  Comments:  Continue Lasix and lisinopril  Orders:  -     CBC & Differential; Future  -     Comprehensive Metabolic Panel; Future  -     Lipid Panel; Future    7. Coronary artery disease of native artery of native heart with stable angina pectoris (CMS/HCC)  Comments:  Continue risk factor modification including Lipitor and Plavix  Orders:  -     metoprolol succinate XL (TOPROL-XL) 25 MG 24 hr tablet; Take 1 tablet by mouth Daily.  Dispense: 90 tablet; Refill: 3  -     potassium chloride 10 MEQ CR tablet; Take 1 tablet by mouth Daily.  Dispense: 90 tablet; Refill: 3  -     CBC & Differential; Future  -     Comprehensive Metabolic Panel; Future  -     Lipid Panel; Future    8. Migraine with aura and without status migrainosus, not intractable  Comments:  Continue metoprolol for migraine prophylaxis    9. Mixed hyperlipidemia  Comments:  Advised low-cholesterol diet  Continue Lipitor    10. Presence of automatic (implantable) cardiac defibrillator  Comments:  Advised to continue maintenance for pacemaker defibrillator with cardiologist regularly    11. Chronic nonseasonal allergic rhinitis due to pollen  Comments:  Continue Singulair  Advised avoidance of seasonal allergens when possible    12. Mixed simple and mucopurulent chronic bronchitis (CMS/HCC)  Comments:  Order chest x-ray to further evaluate COPD and left lung pain  Strongly advised smoking cessation  Refer to pulmonology  Orders:  -     ipratropium-albuterol (DUO-NEB) 0.5-2.5 mg/3 ml nebulizer; Take 3 mL by nebulization 4 (Four) Times a Day.  Dispense: 360 mL; Refill: 5  -     XR Chest PA & Lateral; Future  -     Ambulatory Referral to  Pulmonology    13. Gastroesophageal reflux disease with esophagitis  Comments:  Continue Nexium    14. Acquired hypothyroidism  Comments:  Continue Synthroid 25 mcg daily  Orders:  -     TSH; Future  -     T4, Free; Future    15. Chronic bilateral low back pain with right-sided sciatica  Comments:  Depo-Medrol 80 mg given today to help with acute inflammation  Advised stretching and activity as tolerated    16. Osteoporosis, senile  Comments:  Strongly advised fall precautions  Advised calcium and vitamin D supplementation  DEXA scan will be ordered  Orders:  -     DEXA Bone Density Axial; Future  -     Vitamin D 25 Hydroxy; Future    17. Recurrent major depressive disorder, in partial remission (CMS/HCC)  Comments:  Continue Celexa    18. Tobacco use disorder  Comments:  Strongly advised smoking cessation.  She is not interested in help with smoking cessation at this time.  Orders:  -     XR Chest PA & Lateral; Future    19. Encounter for screening mammogram for malignant neoplasm of breast  -     Mammo Screening Digital Tomosynthesis Bilateral With CAD; Future    20. Encounter for hepatitis C screening test for low risk patient  -     Hepatitis C antibody; Future    21. Decreased hearing of both ears  -     Ambulatory Referral to Audiology    22. Primary osteoarthritis involving multiple joints  Comments:  Start Voltaren gel  Orders:  -     methylPREDNISolone acetate (DEPO-medrol) injection 80 mg  -     diclofenac (VOLTAREN) 1 % gel gel; Apply 4 g topically to the appropriate area as directed 4 (Four) Times a Day As Needed (joint pain).  Dispense: 350 g; Refill: 5    23. Chronic respiratory failure with hypoxia (CMS/HCC)  Comments:  Continue oxygen use 24/7  Refer pulmonology    Other orders  -     Fluarix Quad >6 Months ([id number])  -     Pneumococcal Polysaccharide Vaccine 23-Valent Greater Than or Equal To 1yo Subcutaneous / IM      Follow Up:  Return in about 6 years (around 9/25/2026) for Followup with [facility] just prior to return.     An After Visit Summary and PPPS were given to the patient.

## 2020-09-30 ENCOUNTER — RESULTS ENCOUNTER (OUTPATIENT)
Dept: FAMILY MEDICINE CLINIC | Facility: CLINIC | Age: 70
End: 2020-09-30

## 2020-09-30 DIAGNOSIS — G40.309 NONINTRACTABLE GENERALIZED IDIOPATHIC EPILEPSY WITHOUT STATUS EPILEPTICUS (HCC): ICD-10-CM

## 2020-09-30 DIAGNOSIS — Z11.59 ENCOUNTER FOR HEPATITIS C SCREENING TEST FOR LOW RISK PATIENT: ICD-10-CM

## 2020-10-18 VITALS
OXYGEN SATURATION: 97 % | HEIGHT: 65 IN | SYSTOLIC BLOOD PRESSURE: 136 MMHG | TEMPERATURE: 97.7 F | DIASTOLIC BLOOD PRESSURE: 84 MMHG | BODY MASS INDEX: 23.59 KG/M2 | HEART RATE: 88 BPM | WEIGHT: 141.6 LBS

## 2020-11-06 ENCOUNTER — RESULTS ENCOUNTER (OUTPATIENT)
Dept: FAMILY MEDICINE CLINIC | Facility: CLINIC | Age: 70
End: 2020-11-06

## 2020-11-06 DIAGNOSIS — I25.118 CORONARY ARTERY DISEASE OF NATIVE ARTERY OF NATIVE HEART WITH STABLE ANGINA PECTORIS (HCC): ICD-10-CM

## 2020-11-06 DIAGNOSIS — I50.42 CHRONIC COMBINED SYSTOLIC AND DIASTOLIC CONGESTIVE HEART FAILURE (HCC): ICD-10-CM

## 2020-12-22 DIAGNOSIS — I25.118 CORONARY ARTERY DISEASE OF NATIVE ARTERY OF NATIVE HEART WITH STABLE ANGINA PECTORIS (HCC): ICD-10-CM

## 2020-12-22 RX ORDER — CLOPIDOGREL BISULFATE 75 MG/1
75 TABLET ORAL DAILY
Qty: 90 TABLET | Refills: 3 | Status: SHIPPED | OUTPATIENT
Start: 2020-12-22

## 2021-01-10 ENCOUNTER — READMISSION MANAGEMENT (OUTPATIENT)
Dept: CALL CENTER | Facility: HOSPITAL | Age: 71
End: 2021-01-10

## 2021-01-10 NOTE — OUTREACH NOTE
Prep Survey      Responses   [facility] facility patient discharged from?  Non-[facility]   Is LACE score < 7 ?  Non-BH Discharge   Emergency Room discharge w/ pulse ox?  No   Eligibility  [facility]   Date of Discharge  01/09/21   Discharge Disposition  Home or Self Care   Discharge diagnosis  unavailable   Does the patient have one of the following disease processes/diagnoses(primary or secondary)?  Other   Prep survey completed?  Yes          Birgit Winter RN

## 2021-01-11 ENCOUNTER — TRANSITIONAL CARE MANAGEMENT TELEPHONE ENCOUNTER (OUTPATIENT)
Dept: CALL CENTER | Facility: HOSPITAL | Age: 71
End: 2021-01-11

## 2021-01-11 DIAGNOSIS — G40.309 NONINTRACTABLE GENERALIZED IDIOPATHIC EPILEPSY WITHOUT STATUS EPILEPTICUS (HCC): ICD-10-CM

## 2021-01-11 NOTE — OUTREACH NOTE
Call Center TCM Note      Responses   [facility] patient discharged from?  Non-   Does the patient have one of the following disease processes/diagnoses(primary or secondary)?  Other   TCM attempt successful?  No   Unsuccessful attempts  Attempt 1   Revoked Reason  Phone Issues          Kirstin Gatica RN    1/11/2021, 09:39 EST

## 2021-01-12 DIAGNOSIS — G40.309 NONINTRACTABLE GENERALIZED IDIOPATHIC EPILEPSY WITHOUT STATUS EPILEPTICUS (HCC): ICD-10-CM

## 2021-01-12 RX ORDER — PHENYTOIN SODIUM 100 MG/1
CAPSULE, EXTENDED RELEASE ORAL
Qty: 450 CAPSULE | Refills: 5 | Status: SHIPPED | OUTPATIENT
Start: 2021-01-12 | End: 2021-01-12 | Stop reason: SDUPTHER

## 2021-01-12 RX ORDER — PHENYTOIN SODIUM 100 MG/1
100 CAPSULE, EXTENDED RELEASE ORAL SEE ADMIN INSTRUCTIONS
Qty: 450 CAPSULE | Refills: 5 | Status: SHIPPED | OUTPATIENT
Start: 2021-01-12

## 2021-01-12 NOTE — TELEPHONE ENCOUNTER
PTS DAUGHTER IN LAW LUISA CALLED REQUESTING A REFILL FOR:  Dilantin 100 MG ER capsule    [id number] - [place], [place] - [address] AT [facility] [address] - [phone]  - [phone] FX    PT IS HAS TWO DAYS LEFT

## 2021-01-13 ENCOUNTER — TELEPHONE (OUTPATIENT)
Dept: FAMILY MEDICINE CLINIC | Facility: CLINIC | Age: 71
End: 2021-01-13

## 2021-01-13 NOTE — TELEPHONE ENCOUNTER
PATIENTS DAUGHTER IN LAW, GAIL IS CALLING TO CHECK STATUS OF PRESCRIPTION FOR PATIENT FROM [facility] 01/12.    OXYCODONE 10MG.    PATIENTS DAUGHTER IN LAW STATED THAT [facility] HAS BEEN SPEAKING WITH ONE OF THE NURSES IN THE CLINIC FOR DOCTOR TOR.  PATIENTS DAUGHTER IN LAW DOES NOT KNOW WHO THE NURSE IS THAT THEY'VE BEEN SPEAKING TO.      PLEASE ADIVSE:  [phone]

## 2021-02-21 DIAGNOSIS — E78.2 MIXED HYPERLIPIDEMIA: ICD-10-CM

## 2021-02-23 RX ORDER — ATORVASTATIN CALCIUM 40 MG/1
40 TABLET, FILM COATED ORAL DAILY
Qty: 90 TABLET | Refills: 3 | Status: SHIPPED | OUTPATIENT
Start: 2021-02-23

## 2021-03-28 DIAGNOSIS — J42 CHRONIC BRONCHITIS, UNSPECIFIED CHRONIC BRONCHITIS TYPE (HCC): ICD-10-CM
